# Patient Record
Sex: MALE | Race: WHITE
[De-identification: names, ages, dates, MRNs, and addresses within clinical notes are randomized per-mention and may not be internally consistent; named-entity substitution may affect disease eponyms.]

---

## 2021-03-19 ENCOUNTER — HOSPITAL ENCOUNTER (EMERGENCY)
Dept: HOSPITAL 76 - ED | Age: 36
Discharge: HOME | End: 2021-03-19
Payer: COMMERCIAL

## 2021-03-19 ENCOUNTER — HOSPITAL ENCOUNTER (OUTPATIENT)
Dept: HOSPITAL 76 - EMS | Age: 36
End: 2021-03-19
Payer: COMMERCIAL

## 2021-03-19 VITALS — DIASTOLIC BLOOD PRESSURE: 107 MMHG | SYSTOLIC BLOOD PRESSURE: 145 MMHG
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
## 2021-03-19 DIAGNOSIS — Z04.1: Primary | ICD-10-CM

## 2021-03-19 DIAGNOSIS — M54.5: Primary | ICD-10-CM

## 2021-03-19 DIAGNOSIS — M54.2: ICD-10-CM
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
## 2021-03-19 PROCEDURE — 99282 EMERGENCY DEPT VISIT SF MDM: CPT
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
## 2021-04-02 ENCOUNTER — HOSPITAL ENCOUNTER (OUTPATIENT)
Dept: HOSPITAL 76 - LAB.R | Age: 36
Discharge: HOME | End: 2021-04-02
Attending: FAMILY MEDICINE
Payer: COMMERCIAL

## 2021-04-02 DIAGNOSIS — Z20.822: ICD-10-CM

## 2021-04-02 DIAGNOSIS — R50.9: Primary | ICD-10-CM

## 2021-04-05 ENCOUNTER — HOSPITAL ENCOUNTER (OUTPATIENT)
Dept: HOSPITAL 76 - LAB.N | Age: 36
Discharge: HOME | End: 2021-04-05
Attending: FAMILY MEDICINE
Payer: COMMERCIAL

## 2021-04-05 DIAGNOSIS — Z20.822: ICD-10-CM

## 2021-04-05 DIAGNOSIS — R50.9: Primary | ICD-10-CM

## 2021-04-05 PROCEDURE — 87070 CULTURE OTHR SPECIMN AEROBIC: CPT

## 2021-04-05 PROCEDURE — 87086 URINE CULTURE/COLONY COUNT: CPT

## 2021-04-07 ENCOUNTER — HOSPITAL ENCOUNTER (EMERGENCY)
Dept: HOSPITAL 76 - ED | Age: 36
Discharge: HOME | End: 2021-04-07
Payer: COMMERCIAL

## 2021-04-07 VITALS — SYSTOLIC BLOOD PRESSURE: 122 MMHG | DIASTOLIC BLOOD PRESSURE: 75 MMHG

## 2021-04-07 DIAGNOSIS — J06.9: Primary | ICD-10-CM

## 2021-04-07 DIAGNOSIS — K29.00: ICD-10-CM

## 2021-04-07 LAB
ALBUMIN DIAFP-MCNC: 4.1 G/DL (ref 3.2–5.5)
ALBUMIN/GLOB SERPL: 1.2 {RATIO} (ref 1–2.2)
ALP SERPL-CCNC: 57 IU/L (ref 42–121)
ALT SERPL W P-5'-P-CCNC: 32 IU/L (ref 10–60)
ANION GAP SERPL CALCULATED.4IONS-SCNC: 9 MMOL/L (ref 6–13)
AST SERPL W P-5'-P-CCNC: 21 IU/L (ref 10–42)
BASOPHILS NFR BLD AUTO: 0 10^3/UL (ref 0–0.1)
BASOPHILS NFR BLD AUTO: 0.3 %
BILIRUB BLD-MCNC: 0.5 MG/DL (ref 0.2–1)
BUN SERPL-MCNC: 19 MG/DL (ref 6–20)
C TRACH DNA SPEC NAA+PROBE-ACNC: NEGATIVE
CALCIUM UR-MCNC: 9.2 MG/DL (ref 8.5–10.3)
CHLORIDE SERPL-SCNC: 104 MMOL/L (ref 101–111)
CLARITY UR REFRACT.AUTO: CLEAR
CO2 SERPL-SCNC: 27 MMOL/L (ref 21–32)
CREAT SERPLBLD-SCNC: 1.1 MG/DL (ref 0.6–1.2)
EOSINOPHIL # BLD AUTO: 0 10^3/UL (ref 0–0.7)
EOSINOPHIL NFR BLD AUTO: 0.3 %
ERYTHROCYTE [DISTWIDTH] IN BLOOD BY AUTOMATED COUNT: 12.7 % (ref 12–15)
GFRSERPLBLD MDRD-ARVRAT: 77 ML/MIN/{1.73_M2} (ref 89–?)
GLOBULIN SER-MCNC: 3.4 G/DL (ref 2.1–4.2)
GLUCOSE SERPL-MCNC: 97 MG/DL (ref 70–100)
GLUCOSE UR QL STRIP.AUTO: NEGATIVE MG/DL
HCT VFR BLD AUTO: 40.3 % (ref 42–52)
HGB UR QL STRIP: 13.7 G/DL (ref 14–18)
KETONES UR QL STRIP.AUTO: NEGATIVE MG/DL
LIPASE SERPL-CCNC: 23 U/L (ref 22–51)
LYMPHOCYTES # SPEC AUTO: 2.5 10^3/UL (ref 1.5–3.5)
LYMPHOCYTES NFR BLD AUTO: 24.3 %
MCH RBC QN AUTO: 33.2 PG (ref 27–31)
MCHC RBC AUTO-ENTMCNC: 34 G/DL (ref 32–36)
MCV RBC AUTO: 97.6 FL (ref 80–94)
MONOCYTES # BLD AUTO: 1.2 10^3/UL (ref 0–1)
MONOCYTES NFR BLD AUTO: 11.4 %
N GONORRHOEA DNA GENITAL QL NAA+PROBE: NEGATIVE
NEUTROPHILS # BLD AUTO: 6.6 10^3/UL (ref 1.5–6.6)
NEUTROPHILS # SNV AUTO: 10.4 X10^3/UL (ref 4.8–10.8)
NEUTROPHILS NFR BLD AUTO: 63.1 %
NITRITE UR QL STRIP.AUTO: NEGATIVE
NRBC # BLD AUTO: 0 /100WBC
NRBC # BLD AUTO: 0 X10^3/UL
PDW BLD AUTO: 9.4 FL (ref 7.4–11.4)
PH UR STRIP.AUTO: 6 PH (ref 5–7.5)
PLATELET # BLD: 277 10^3/UL (ref 130–450)
POTASSIUM SERPL-SCNC: 4 MMOL/L (ref 3.5–5)
PROT SPEC-MCNC: 7.5 G/DL (ref 6.7–8.2)
PROT UR STRIP.AUTO-MCNC: NEGATIVE MG/DL
RBC # UR STRIP.AUTO: (no result) /UL
RBC MAR: 4.13 10^6/UL (ref 4.7–6.1)
SODIUM SERPLBLD-SCNC: 140 MMOL/L (ref 135–145)
SP GR UR STRIP.AUTO: 1.02 (ref 1–1.03)
T VAGINALIS RRNA GENITAL QL PROBE: (no result)
UROBILINOGEN UR QL STRIP.AUTO: (no result) E.U./DL
UROBILINOGEN UR STRIP.AUTO-MCNC: NEGATIVE MG/DL

## 2021-04-07 PROCEDURE — 81003 URINALYSIS AUTO W/O SCOPE: CPT

## 2021-04-07 PROCEDURE — 87086 URINE CULTURE/COLONY COUNT: CPT

## 2021-04-07 PROCEDURE — 99285 EMERGENCY DEPT VISIT HI MDM: CPT

## 2021-04-07 PROCEDURE — 87591 N.GONORRHOEAE DNA AMP PROB: CPT

## 2021-04-07 PROCEDURE — 85025 COMPLETE CBC W/AUTO DIFF WBC: CPT

## 2021-04-07 PROCEDURE — 99284 EMERGENCY DEPT VISIT MOD MDM: CPT

## 2021-04-07 PROCEDURE — 76705 ECHO EXAM OF ABDOMEN: CPT

## 2021-04-07 PROCEDURE — 96374 THER/PROPH/DIAG INJ IV PUSH: CPT

## 2021-04-07 PROCEDURE — 80053 COMPREHEN METABOLIC PANEL: CPT

## 2021-04-07 PROCEDURE — 87661 TRICHOMONAS VAGINALIS AMPLIF: CPT

## 2021-04-07 PROCEDURE — 87491 CHLMYD TRACH DNA AMP PROBE: CPT

## 2021-04-07 PROCEDURE — 83690 ASSAY OF LIPASE: CPT

## 2021-04-07 PROCEDURE — 96375 TX/PRO/DX INJ NEW DRUG ADDON: CPT

## 2021-04-07 PROCEDURE — 36415 COLL VENOUS BLD VENIPUNCTURE: CPT

## 2021-04-07 PROCEDURE — 81001 URINALYSIS AUTO W/SCOPE: CPT

## 2021-04-07 NOTE — EXTERNAL MEDICAL SUMMARY RPT
Continuity of Care Document

                            Created on:2021



Patient:SUNIL VARGHESE

Sex:Male

:1985

External Reference #:[id number]





Demographics







                          Phone                     Unavailable

 

                          Preferred Language        Unknown

 

                          Marital Status            Unknown

 

                          Zoroastrian Affiliation     Unknown

 

                          Race                      Unknown

 

                          Ethnic Group              Unknown









Author







                          Organization              [facility]

 

                          [place]                    [place] [address]

 

                          Phone                     [phone]









Care Team Providers







                    Name                Role                Phone

 

                    Registrar, Rosa  Robledo,Patient Unavailable         Rudy lombardo RN, Jennifer Goodman,  Unavailable         Unavailable

 

                    MD, Galo Liang, Unavailable         Unavailable

 

                    PAARI, Jonny Nix, Unavailable         Unavailable

 

                    MD, Oliver Suazo, Unavailable         Unavailable









Problems







                     date                description         facility

 

                     [id number]            Alcohol use         All

 

                     [id number]            COVID19 Testing     All

 

                     [id number]            Fever, unspecified  All

 

                     [id number]            Details of drug misuse behavior  All

 

                     07923307            Alcohol intake      All

 

                     80034069            Fever               All

 

                     54425549            Acute pharyngitis   All

 

                     17543044            Acute pharyngitis, unspecified  All

 

                     [id number]            Alcohol use         All

 

                     12717147            COVID19 Testing     All

 

                     05345583            Throat Culture      All

 

                     39412374            Urine C&S           All

 

                     56719038            Details of drug misuse behavior  All

 

                     85418102            Exudative pharyngitis  All







Medications







                     date                description         facility

 

                     59234098            CYCLOBENZAPRINE HCL  All

 

                     35998043            IBUPROFEN           All

 

                     90657804            IBUPROFEN           All

 

                     29375107            CYCLOBENZAPRINE HCL  All

 

                     41941416            CYCLOBENZAPRINE HCL  All

 

                     12021852            IBUPROFEN           All

 

                     69250740            IBUPROFEN           All

 

                     54752472            CYCLOBENZAPRINE HCL  All

 

                     24632020            CYCLOBENZAPRINE HCL  All

 

                     63912986            IBUPROFEN           All

 

                     [id number]            IBUPROFEN           All

 

                     57222958            CYCLOBENZAPRINE HCL  All

 

                     36944810            CYCLOBENZAPRINE HCL  All

 

                     82529381            IBUPROFEN           All

 

                     17318554            IBUPROFEN           All

 

                     03896765            CYCLOBENZAPRINE HCL  All

 

                     64306611            CYCLOBENZAPRINE HCL  All

 

                     22332233            IBUPROFEN           All

 

                     63300949            IBUPROFEN           All

 

                     03215487            CYCLOBENZAPRINE HCL  All

 

                     94073050            AMOXICILLIN-POT CLAVULANATE  All

 

                     55913720            AMOXICILLIN-POT CLAVULANATE  All

 

                     64457613            AMOXICILLIN-POT CLAVULANATE  All

 

                     12137544            AMOXICILLIN-POT CLAVULANATE  All







Procedures







                     date                description         facility

 

                     67282424            POC STREP ASSAY W/OPTIC  All









                     date                description         facility

 

                     91883631            COVID19 Testing     All









                     date                description         facility

 

                     49535134            POC STREP ASSAY W/OPTIC  All









                     date                description         facility

 

                     05827414            COVID19 Testing     All









                     date                description         facility

 

                     18242636            POC STREP ASSAY W/OPTIC  All









                     date                description         facility

 

                     72225266            COVID19 Testing     All









                     date                description         facility

 

                     03903243            POC STREP ASSAY W/OPTIC  All









                     date                description         facility

 

                     99737816            POC STREP ASSAY W/OPTIC  All









                     date                description         facility

 

                     66910835            POC URINALYSIS DIP  All









                     date                description         facility

 

                     31320814            POC STREP TEST      All









                     date                description         facility

 

                     79922954            IM or SQ Injection  All









                     date                description         facility

 

                     81565692            Dexamethasone Sodium Phosphate Inj 10mg

/1mL  All









                     date                description         facility

 

                     83553450            POC URINALYSIS DIP  All









                     date                description         facility

 

                     96183857            POC STREP TEST      All









                     date                description         facility

 

                     96439251            IM or SQ Injection  All









                     date                description         facility

 

                     69878712            Dexamethasone Sodium Phosphate Inj 10mg

/1mL  All









                     date                description         facility

 

                     31905993            POC URINALYSIS DIP  All









                     date                description         facility

 

                     26667992            POC STREP TEST      All









                     date                description         facility

 

                     34080803            IM or SQ Injection  All









                     date                description         facility

 

                     82232727            Dexamethasone Sodium Phosphate Inj 10mg

/1mL  All







Results







            test       status     date       ordered by  attending  specimen jun

e

 

            Microbial_identificati  unknown    75095808   unknown    unknown    

unknown



           on_kit_rapid_strep_meth                                             



           od                                                     

 

            Streptococcus_pyogenes  unknown    76313224   unknown    unknown    

unknown



           _DNA_Presence_in_Throat                                             



           _by_NAA_with_probe_dete                                             



           ction                                                  

 

            T          unknown    15986611   unknown    unknown    unknown

 

            COVID-19_REFERENCE_TES  unknown    01181229   unknown    unknown    

unknown



           T                                                      

 

            Microbial_identificati  unknown    86419179   unknown    unknown    

unknown



           on_kit_rapid_strep_meth                                             



           od                                                     

 

            Streptococcus_pyogenes  unknown    39645939   unknown    unknown    

unknown



           _DNA_Presence_in_Throat                                             



           _by_NAA_with_probe_dete                                             



           ction                                                  

 

            _2019NCoV_COVID-19_Lab  unknown    90308360   unknown    unknown    

unknown



           _Test_Result_Text_                                             

 

            T          unknown    17966563   unknown    unknown    unknown

 

            COVID-19_REFERENCE_TES  unknown    99188717   unknown    unknown    

unknown



           T                                                      

 

            _2019NCoV_COVID-19_Lab  unknown    03133347   unknown    unknown    

unknown



           _Test_Result_Text_                                             

 

            Microbial_identificati  unknown    79149345   unknown    unknown    

unknown



           on_kit_rapid_strep_meth                                             



           od                                                     

 

            Streptococcus_pyogenes  unknown    26203141   unknown    unknown    

unknown



           _DNA_Presence_in_Throat                                             



           _by_NAA_with_probe_dete                                             



           ction                                                  

 

            DIPSTICK_URINE_STRIP_L  unknown    02797169   unknown    unknown    

unknown



           OT_NUMBER                                              

 

            protein_urine_semiquan  unknown    43597753   unknown    unknown    

unknown



           titative_dipstick_                                             

 

            glucose_urine_semiquan  unknown    06754512   unknown    unknown    

unknown



           titative                                               

 

            Erythrocytes_area_in_U  unknown    77182268   unknown    unknown    

unknown



           rine_sediment_by_Micros                                             



           copy_high_power_field                                             

 

            RBC_urine_dipstick  unknown    04840019   unknown    unknown    unkn

own

 

            Albumin_Presence_in_Ur  unknown    06766651   unknown    unknown    

unknown



           ine                                                    

 

            urine_color  unknown    26314168   unknown    unknown    unknown

 

            bilirubin_urine  unknown    29743788   unknown    unknown    unknown

 

            ketones_urine_by_test_  unknown    17970570   unknown    unknown    

unknown



           strip                                                  

 

            nitrite_urine_semiquan  unknown    12087625   unknown    unknown    

unknown



           titative                                               

 

            pH_urine_semiquantitat  unknown    30341451   unknown    unknown    

unknown



           baylee                                                    

 

            specific_gravity_urine  unknown    19665457   unknown    unknown    

unknown

 

            urobilinogen_urine_sem  unknown    04517577   unknown    unknown    

unknown



           iquantitative_dipstick_                                             

 

            leukocyte_esterase_uri  unknown    77108222   unknown    unknown    

unknown



           ne_by_dipstick                                             

 

            appearance_urine  unknown    34687070   unknown    unknown    unknow

n

 

            Microbial_identificati  unknown    49988034   unknown    unknown    

unknown



           on_kit_rapid_strep_meth                                             



           od                                                     

 

            urinalysis_routine  unknown    00931448   unknown    unknown    unkn

own

 

            culture_status  unknown    87115223   unknown    unknown    unknown

 

            Appearance_of_Urine  unknown    16911163   unknown    unknown    unk

nown

 

            Bilirubin.total_Presen  unknown    30963874   unknown    unknown    

unknown



           ce_in_Urine_by_Test_str                                             



           ip                                                     

 

            Color_of_Urine  unknown    33293029   unknown    unknown    unknown

 

            Glucose_Mass_volume_in  unknown    90569234   unknown    unknown    

unknown



           _Urine_by_Test_strip                                             

 

            Ketones_Mass_volume_in  unknown    74910472   unknown    unknown    

unknown



           _Urine_by_Test_strip                                             

 

            Leukocyte_esterase_Pre  unknown    98638790   unknown    unknown    

unknown



           sence_in_Urine_by_Test_                                             



           strip                                                  

 

            Nitrite_Presence_in_Ur  unknown    32503717   unknown    unknown    

unknown



           ine_by_Test_strip                                             

 

            pH_of_Urine_by_Test_st  unknown    34610755   unknown    unknown    

unknown



           rip                                                    

 

            Specific_gravity_of_Ur  unknown    17717559   unknown    unknown    

unknown



           ine_by_Test_strip                                             

 

            Urobilinogen_Presence_  unknown    06186888   unknown    unknown    

unknown



           in_Urine_by_Test_strip                                             

 

            Streptococcus_pyogenes  unknown    59867690   unknown    unknown    

unknown



           _DNA_Presence_in_Throat                                             



           _by_NAA_with_probe_dete                                             



           ction                                                  

 

            T          unknown    83239571   unknown    unknown    unknown

 

            COVID-19_REFERENCE_TES  unknown    74703276   unknown    unknown    

unknown



           T                                                      

 

            DIPSTICK_URINE_STRIP_L  unknown    10286244   unknown    unknown    

unknown



           OT_NUMBER                                              

 

            protein_urine_semiquan  unknown    50731647   unknown    unknown    

unknown



           titative_dipstick_                                             

 

            glucose_urine_semiquan  unknown    56171131   unknown    unknown    

unknown



           titative                                               

 

            Erythrocytes_area_in_U  unknown    48982089   unknown    unknown    

unknown



           rine_sediment_by_Micros                                             



           copy_high_power_field                                             

 

            RBC_urine_dipstick  unknown    69758855   unknown    unknown    unkn

own

 

            Albumin_Presence_in_Ur  unknown    80082847   unknown    unknown    

unknown



           ine                                                    

 

            urine_color  unknown    96182395   unknown    unknown    unknown

 

            bilirubin_urine  unknown    90825773   unknown    unknown    unknown

 

            ketones_urine_by_test_  unknown    49601120   unknown    unknown    

unknown



           strip                                                  

 

            nitrite_urine_semiquan  unknown    00385061   unknown    unknown    

unknown



           titative                                               

 

            pH_urine_semiquantitat  unknown    86513036   unknown    unknown    

unknown



           baylee                                                    

 

            specific_gravity_urine  unknown    89630390   unknown    unknown    

unknown

 

            urobilinogen_urine_sem  unknown    91107614   unknown    unknown    

unknown



           iquantitative_dipstick_                                             

 

            leukocyte_esterase_uri  unknown    59892646   unknown    unknown    

unknown



           ne_by_dipstick                                             

 

            appearance_urine  unknown    88813206   unknown    unknown    unknow

n

 

            Microbial_identificati  unknown    39139324   unknown    unknown    

unknown



           on_kit_rapid_strep_meth                                             



           od                                                     

 

            urinalysis_routine  unknown    85422517   unknown    unknown    unkn

own

 

            culture_status  unknown    76667689   unknown    unknown    unknown

 

            Appearance_of_Urine  unknown    20339477   unknown    unknown    unk

nown

 

            Bilirubin.total_Presen  unknown    34257452   unknown    unknown    

unknown



           ce_in_Urine_by_Test_str                                             



           ip                                                     

 

            Color_of_Urine  unknown    80614077   unknown    unknown    unknown

 

            Glucose_Mass_volume_in  unknown    93382970   unknown    unknown    

unknown



           _Urine_by_Test_strip                                             

 

            Ketones_Mass_volume_in  unknown    18472497   unknown    unknown    

unknown



           _Urine_by_Test_strip                                             

 

            Leukocyte_esterase_Pre  unknown    77376719   unknown    unknown    

unknown



           sence_in_Urine_by_Test_                                             



           strip                                                  

 

            Nitrite_Presence_in_Ur  unknown    31147145   unknown    unknown    

unknown



           ine_by_Test_strip                                             

 

            pH_of_Urine_by_Test_st  unknown    47977947   unknown    unknown    

unknown



           rip                                                    

 

            Specific_gravity_of_Ur  unknown    07220496   unknown    unknown    

unknown



           ine_by_Test_strip                                             

 

            Urobilinogen_Presence_  unknown    95576959   unknown    unknown    

unknown



           in_Urine_by_Test_strip                                             

 

            Streptococcus_pyogenes  unknown    94650661   unknown    unknown    

unknown



           _DNA_Presence_in_Throat                                             



           _by_NAA_with_probe_dete                                             



           ction                                                  

 

            _2019NCoV_COVID-19_Lab  unknown    49007518   unknown    unknown    

unknown



           _Test_Result_Text_                                             

 

            DIPSTICK_URINE_STRIP_L  unknown    93400327   unknown    unknown    

unknown



           OT_NUMBER                                              

 

            protein_urine_semiquan  unknown    52149970   unknown    unknown    

unknown



           titative_dipstick_                                             

 

            glucose_urine_semiquan  unknown    69206785   unknown    unknown    

unknown



           titative                                               

 

            Erythrocytes_area_in_U  unknown    28858162   unknown    unknown    

unknown



           rine_sediment_by_Micros                                             



           copy_high_power_field                                             

 

            RBC_urine_dipstick  unknown    32662634   unknown    unknown    unkn

own

 

            Albumin_Presence_in_Ur  unknown    73632959   unknown    unknown    

unknown



           ine                                                    

 

            urine_color  unknown    22132785   unknown    unknown    unknown

 

            bilirubin_urine  unknown    20855756   unknown    unknown    unknown

 

            ketones_urine_by_test_  unknown    82681832   unknown    unknown    

unknown



           strip                                                  

 

            nitrite_urine_semiquan  unknown    26605162   unknown    unknown    

unknown



           titative                                               

 

            pH_urine_semiquantitat  unknown    54918002   unknown    unknown    

unknown



           baylee                                                    

 

            specific_gravity_urine  unknown    45264817   unknown    unknown    

unknown

 

            urobilinogen_urine_sem  unknown    44209503   unknown    unknown    

unknown



           iquantitative_dipstick_                                             

 

            leukocyte_esterase_uri  unknown    48122333   unknown    unknown    

unknown



           ne_by_dipstick                                             

 

            appearance_urine  unknown    77568078   unknown    unknown    unknow

n

 

            Microbial_identificati  unknown    31218387   unknown    unknown    

unknown



           on_kit_rapid_strep_meth                                             



           od                                                     

 

            urinalysis_routine  unknown    74428371   unknown    unknown    unkn

own

 

            culture_status  unknown    41920541   unknown    unknown    unknown

 

            Appearance_of_Urine  unknown    41653477   unknown    unknown    unk

nown

 

            Bilirubin.total_Presen  unknown    25956836   unknown    unknown    

unknown



           ce_in_Urine_by_Test_str                                             



           ip                                                     

 

            Color_of_Urine  unknown    26973557   unknown    unknown    unknown

 

            Glucose_Mass_volume_in  unknown    22751699   unknown    unknown    

unknown



           _Urine_by_Test_strip                                             

 

            Ketones_Mass_volume_in  unknown    20010663   unknown    unknown    

unknown



           _Urine_by_Test_strip                                             

 

            Leukocyte_esterase_Pre  unknown    25337772   unknown    unknown    

unknown



           sence_in_Urine_by_Test_                                             



           strip                                                  

 

            Nitrite_Presence_in_Ur  unknown    44839919   unknown    unknown    

unknown



           ine_by_Test_strip                                             

 

            pH_of_Urine_by_Test_st  unknown    25578740   unknown    unknown    

unknown



           rip                                                    

 

            Specific_gravity_of_Ur  unknown    70365066   unknown    unknown    

unknown



           ine_by_Test_strip                                             

 

            Urobilinogen_Presence_  unknown    2021   unknown    unknown    

unknown



           in_Urine_by_Test_strip                                             

 

            Streptococcus_pyogenes  unknown    2021   unknown    unknown    

unknown



           _DNA_Presence_in_Throat                                             



           _by_NAA_with_probe_dete                                             



           ction                                                  









         facility  observation  status  value   reference  units   lab     [facility]

mal  line



                                        range           code            notes

 

         All     Microbial_id  unknown  Neg     unknown          _3554   unknown

  unknown



                entification_                                                 



                kit_rapid_str                                                 



                ep_method                                                 

 

         All     Streptococcu  unknown  Neg     unknown          _6048   unknown

  unknown



                s_pyogenes_DN                                 9-2             



                A_Presence_in                                                 



                _Throat_by_NA                                                 



                A_with_probe_                                                 



                detection                                                 

 

         All     T       unknown  NEGATIVE  unknown          COVID   unknown  un

known



                                                        -19             

 

         All     COVID-19_REF  unknown  NEGATIVE  unknown          COVID   unkno

wn  unknown



                ERENCE_TEST                                 19.REF          

 

         All     Microbial_id  unknown  Neg     unknown          _3554   unknown

  unknown



                entification_                                                 



                kit_rapid_str                                                 



                ep_method                                                 

 

         All     Streptococcu  unknown  Neg     unknown          _6048   unknown

  unknown



                s_pyogenes_DN                                 9-2             



                A_Presence_in                                                 



                _Throat_by_NA                                                 



                A_with_probe_                                                 



                detection                                                 

 

         All     _2019NCoV_CO  unknown  NEGATIVE  unknown          _6659   unkno

wn  unknown



                VID-19_Lab_Te                                 97              



                st_Result_Tex                                                 



                t_                                                      

 

         All     T       unknown  NEGATIVE  unknown          COVID   unknown  un

known



                                                        -19             

 

         All     COVID-19_REF  unknown  NEGATIVE  unknown          COVID   unkno

wn  unknown



                ERENCE_TEST                                 19.REF          

 

         All     _2019NCoV_CO  unknown  NEGATIVE  unknown          _6659   unkno

wn  unknown



                VID-19_Lab_Te                                 97              



                st_Result_Tex                                                 



                t_                                                      

 

         All     Microbial_id  unknown  Neg     unknown          _3554   unknown

  unknown



                entification_                                                 



                kit_rapid_str                                                 



                ep_method                                                 

 

         All     Streptococcu  unknown  Neg     unknown          _6048   unknown

  unknown



                s_pyogenes_DN                                 9-2             



                A_Presence_in                                                 



                _Throat_by_NA                                                 



                A_with_probe_                                                 



                detection                                                 

 

         All     DIPSTICK_URI  unknown  5067    unknown          _1014   unknown

  unknown



                NE_STRIP_LOT_                                 00              



                NUMBER                                                  

 

         All     protein_urin  unknown  negative  unknown          _118    unkno

wn  unknown



                e_semiquantit                                                 



                ative_dipstic                                                 



                k_                                                      

 

         All     glucose_urin  unknown  negative  unknown          _123    unkno

wn  unknown



                e_semiquantit                                                 



                ative                                                   

 

         All     Erythrocytes  unknown  2+      unknown          _1394   unknown

  unknown



                _area_in_Urin                                 5-1             



                e_sediment_by                                                 



                _Microscopy_h                                                 



                igh_power_fie                                                 



                ld                                                      

 

         All     RBC_urine_di  unknown  2+      unknown          _1700   unknown

  unknown



                pstick                                  005             

 

         All     Albumin_Pres  unknown  negative  unknown          _1753   unkno

wn  unknown



                ence_in_Urine                                 -3              

 

         All     urine_color  unknown  dark    unknown          _2751   unknown 

 unknown



                                yellow                                  

 

         All     bilirubin_ur  unknown  negative  unknown          _319    unkno

wn  unknown



                ine                                                     

 

         All     ketones_urin  unknown  large   unknown          _322    unknown

  unknown



                e_by_test_str         (160)                                   



                ip                                                      

 

         All     nitrite_urin  unknown  negative  unknown          _323    unkno

wn  unknown



                e_semiquantit                                                 



                ative                                                   

 

         All     pH_urine_sem  unknown  7.5     unknown          _324    unknown

  unknown



                iquantitative                                                 

 

         All     specific_gra  unknown  1.020   unknown          _325    unknown

  unknown



                vity_urine                                                 

 

         All     urobilinogen  unknown  negative  unknown          _326    unkno

wn  unknown



                _urine_semiqu                                                 



                antitative_di                                                 



                pstick_                                                 

 

         All     leukocyte_es  unknown  negative  unknown          _327    unkno

wn  unknown



                terase_urine_                                                 



                by_dipstick                                                 

 

         All     appearance_u  unknown  clear   unknown          _328    unknown

  unknown



                rine                                                    

 

         All     Microbial_id  unknown  Neg     unknown          _3554   unknown

  unknown



                entification_                                                 



                kit_rapid_str                                                 



                ep_method                                                 

 

         All     urinalysis_r  unknown  Clean   unknown          _47     unknown

  unknown



                outine          Catch                                   

 

         All     culture_stat  unknown  Yes     unknown          _5101   unknown

  unknown



                us                                      5               

 

         All     Appearance_o  unknown  clear   unknown          _5767   unknown

  unknown



                f_Urine                                 -9              

 

         All     Bilirubin.to  unknown  negative  unknown          _5770   unkno

wn  unknown



                tal_Presence_                                 -3              



                in_Urine_by_T                                                 



                est_strip                                                 

 

         All     Color_of_Uri  unknown  dark    unknown          _5778   unknown

  unknown



                ne              yellow                  -6              

 

         All     Glucose_Mass  unknown  negative  unknown          _5792   unkno

wn  unknown



                _volume_in_Ur                                 -7              



                ine_by_Test_s                                                 



                trip                                                    

 

         All     Ketones_Mass  unknown  large   unknown          _5797   unknown

  unknown



                _volume_in_Ur         (160)                   -6              



                ine_by_Test_s                                                 



                trip                                                    

 

         All     Leukocyte_es  unknown  negative  unknown          _5799   unkno

wn  unknown



                terase_Presen                                 -2              



                ce_in_Urine_b                                                 



                y_Test_strip                                                 

 

         All     Nitrite_Pres  unknown  negative  unknown          _5802   unkno

wn  unknown



                ence_in_Urine                                 -4              



                _by_Test_stri                                                 



                p                                                       

 

         All     pH_of_Urine_  unknown  7.5     unknown          _5803   unknown

  unknown



                by_Test_strip                                 -2              

 

         All     Specific_gra  unknown  1.020   unknown          _5811   unknown

  unknown



                vity_of_Urine                                 -5              



                _by_Test_stri                                                 



                p                                                       

 

         All     Urobilinogen  unknown  negative  unknown          _5818   unkno

wn  unknown



                _Presence_in_                                 -0              



                Urine_by_Test                                                 



                _strip                                                  

 

         All     Streptococcu  unknown  Neg     unknown          _6048   unknown

  unknown



                s_pyogenes_DN                                 9-2             



                A_Presence_in                                                 



                _Throat_by_NA                                                 



                A_with_probe_                                                 



                detection                                                 

 

         All     T       unknown  NEGATIVE  unknown          COVID   unknown  un

known



                                                        -19             

 

         All     COVID-19_REF  unknown  NEGATIVE  unknown          COVID   unkno

wn  unknown



                ERENCE_TEST                                 19.REF          

 

         All     DIPSTICK_URI  unknown  5067    unknown          _1014   unknown

  unknown



                NE_STRIP_LOT_                                 00              



                NUMBER                                                  

 

         All     protein_urin  unknown  negative  unknown          _118    unkno

wn  unknown



                e_semiquantit                                                 



                ative_dipstic                                                 



                k_                                                      

 

         All     glucose_urin  unknown  negative  unknown          _123    unkno

wn  unknown



                e_semiquantit                                                 



                ative                                                   

 

         All     Erythrocytes  unknown  2+      unknown          _1394   unknown

  unknown



                _area_in_Urin                                 5-1             



                e_sediment_by                                                 



                _Microscopy_h                                                 



                igh_power_fie                                                 



                ld                                                      

 

         All     RBC_urine_di  unknown  2+      unknown          _1700   unknown

  unknown



                pstick                                  005             

 

         All     Albumin_Pres  unknown  negative  unknown          _1753   unkno

wn  unknown



                ence_in_Urine                                 -3              

 

         All     urine_color  unknown  dark    unknown          _2751   unknown 

 unknown



                                yellow                                  

 

         All     bilirubin_ur  unknown  negative  unknown          _319    unkno

wn  unknown



                ine                                                     

 

         All     ketones_urin  unknown  large   unknown          _322    unknown

  unknown



                e_by_test_str         (160)                                   



                ip                                                      

 

         All     nitrite_urin  unknown  negative  unknown          _323    unkno

wn  unknown



                e_semiquantit                                                 



                ative                                                   

 

         All     pH_urine_sem  unknown  7.5     unknown          _324    unknown

  unknown



                iquantitative                                                 

 

         All     specific_gra  unknown  1.020   unknown          _325    unknown

  unknown



                vity_urine                                                 

 

         All     urobilinogen  unknown  negative  unknown          _326    unkno

wn  unknown



                _urine_semiqu                                                 



                antitative_di                                                 



                pstick_                                                 

 

         All     leukocyte_es  unknown  negative  unknown          _327    unkno

wn  unknown



                terase_urine_                                                 



                by_dipstick                                                 

 

         All     appearance_u  unknown  clear   unknown          _328    unknown

  unknown



                rine                                                    

 

         All     Microbial_id  unknown  Neg     unknown          _3554   unknown

  unknown



                entification_                                                 



                kit_rapid_str                                                 



                ep_method                                                 

 

         All     urinalysis_r  unknown  Clean   unknown          _47     unknown

  unknown



                outine          Catch                                   

 

         All     culture_stat  unknown  Yes     unknown          _5101   unknown

  unknown



                us                                      5               

 

         All     Appearance_o  unknown  clear   unknown          _5767   unknown

  unknown



                f_Urine                                 -9              

 

         All     Bilirubin.to  unknown  negative  unknown          _5770   unkno

wn  unknown



                tal_Presence_                                 -3              



                in_Urine_by_T                                                 



                est_strip                                                 

 

         All     Color_of_Uri  unknown  dark    unknown          _5778   unknown

  unknown



                ne              yellow                  -6              

 

         All     Glucose_Mass  unknown  negative  unknown          _5792   unkno

wn  unknown



                _volume_in_Ur                                 -7              



                ine_by_Test_s                                                 



                trip                                                    

 

         All     Ketones_Mass  unknown  large   unknown          _5797   unknown

  unknown



                _volume_in_Ur         (160)                   -6              



                ine_by_Test_s                                                 



                trip                                                    

 

         All     Leukocyte_es  unknown  negative  unknown          _5799   unkno

wn  unknown



                terase_Presen                                 -2              



                ce_in_Urine_b                                                 



                y_Test_strip                                                 

 

         All     Nitrite_Pres  unknown  negative  unknown          _5802   unkno

wn  unknown



                ence_in_Urine                                 -4              



                _by_Test_stri                                                 



                p                                                       

 

         All     pH_of_Urine_  unknown  7.5     unknown          _5803   unknown

  unknown



                by_Test_strip                                 -2              

 

         All     Specific_gra  unknown  1.020   unknown          _5811   unknown

  unknown



                vity_of_Urine                                 -5              



                _by_Test_stri                                                 



                p                                                       

 

         All     Urobilinogen  unknown  negative  unknown          _5818   unkno

wn  unknown



                _Presence_in_                                 -0              



                Urine_by_Test                                                 



                _strip                                                  

 

         All     Streptococcu  unknown  Neg     unknown          _6048   unknown

  unknown



                s_pyogenes_DN                                 9-2             



                A_Presence_in                                                 



                _Throat_by_NA                                                 



                A_with_probe_                                                 



                detection                                                 

 

         All     _2019NCoV_CO  unknown  NEGATIVE  unknown          _6659   unkno

wn  unknown



                VID-19_Lab_Te                                 97              



                st_Result_Tex                                                 



                t_                                                      

 

         All     DIPSTICK_URI  unknown  5067    unknown          _1014   unknown

  unknown



                NE_STRIP_LOT_                                 00              



                NUMBER                                                  

 

         All     protein_urin  unknown  negative  unknown          _118    unkno

wn  unknown



                e_semiquantit                                                 



                ative_dipstic                                                 



                k_                                                      

 

         All     glucose_urin  unknown  negative  unknown          _123    unkno

wn  unknown



                e_semiquantit                                                 



                ative                                                   

 

         All     Erythrocytes  unknown  2+      unknown          _1394   unknown

  unknown



                _area_in_Urin                                 5-1             



                e_sediment_by                                                 



                _Microscopy_h                                                 



                igh_power_fie                                                 



                ld                                                      

 

         All     RBC_urine_di  unknown  2+      unknown          _1700   unknown

  unknown



                pstick                                  005             

 

         All     Albumin_Pres  unknown  negative  unknown          _1753   unkno

wn  unknown



                ence_in_Urine                                 -3              

 

         All     urine_color  unknown  dark    unknown          _2751   unknown 

 unknown



                                yellow                                  

 

         All     bilirubin_ur  unknown  negative  unknown          _319    unkno

wn  unknown



                ine                                                     

 

         All     ketones_urin  unknown  large   unknown          _322    unknown

  unknown



                e_by_test_str         (160)                                   



                ip                                                      

 

         All     nitrite_urin  unknown  negative  unknown          _323    unkno

wn  unknown



                e_semiquantit                                                 



                ative                                                   

 

         All     pH_urine_sem  unknown  7.5     unknown          _324    unknown

  unknown



                iquantitative                                                 

 

         All     specific_gra  unknown  1.020   unknown          _325    unknown

  unknown



                vity_urine                                                 

 

         All     urobilinogen  unknown  negative  unknown          _326    unkno

wn  unknown



                _urine_semiqu                                                 



                antitative_di                                                 



                pstick_                                                 

 

         All     leukocyte_es  unknown  negative  unknown          _327    unkno

wn  unknown



                terase_urine_                                                 



                by_dipstick                                                 

 

         All     appearance_u  unknown  clear   unknown          _328    unknown

  unknown



                rine                                                    

 

         All     Microbial_id  unknown  Neg     unknown          _3554   unknown

  unknown



                entification_                                                 



                kit_rapid_str                                                 



                ep_method                                                 

 

         All     urinalysis_r  unknown  Clean   unknown          _47     unknown

  unknown



                outine          Catch                                   

 

         All     culture_stat  unknown  Yes     unknown          _5101   unknown

  unknown



                us                                      5               

 

         All     Appearance_o  unknown  clear   unknown          _[id number]   unknown

  unknown



                f_Urine                                 -9              

 

         All     Bilirubin.to  unknown  negative  unknown          _5770   unkno

wn  unknown



                tal_Presence_                                 -3              



                in_Urine_by_T                                                 



                est_strip                                                 

 

         All     Color_of_Uri  unknown  dark    unknown          _5778   unknown

  unknown



                ne              yellow                  -6              

 

         All     Glucose_Mass  unknown  negative  unknown          _5792   unkno

wn  unknown



                _volume_in_Ur                                 -7              



                ine_by_Test_s                                                 



                trip                                                    

 

         All     Ketones_Mass  unknown  large   unknown          _5797   unknown

  unknown



                _volume_in_Ur         (160)                   -6              



                ine_by_Test_s                                                 



                trip                                                    

 

         All     Leukocyte_es  unknown  negative  unknown          _5799   unkno

wn  unknown



                terase_Presen                                 -2              



                ce_in_Urine_b                                                 



                y_Test_strip                                                 

 

         All     Nitrite_Pres  unknown  negative  unknown          _5802   unkno

wn  unknown



                ence_in_Urine                                 -4              



                _by_Test_stri                                                 



                p                                                       

 

         All     pH_of_Urine_  unknown  7.5     unknown          _5803   unknown

  unknown



                by_Test_strip                                 -2              

 

         All     Specific_gra  unknown  1.020   unknown          _5811   unknown

  unknown



                vity_of_Urine                                 -5              



                _by_Test_stri                                                 



                p                                                       

 

         All     Urobilinogen  unknown  negative  unknown          _5818   unkno

wn  unknown



                _Presence_in_                                 -0              



                Urine_by_Test                                                 



                _strip                                                  

 

         All     Streptococcu  unknown  Neg     unknown          _6048   unknown

  unknown



                s_pyogenes_DN                                 9-2             



                A_Presence_in                                                 



                _Throat_by_NA                                                 



                A_with_probe_                                                 



                detection                                                 







Vital Signs







                 date            measurement     value           source

 

                 2021        BP_diastolic    73              mm[Hg]

 

                 2021        BP_systolic     121             mm[Hg]

 

                 2021        heart_rate      71              /min

 

                 2021        respiration_rate  12              /min

 

                 2021        temperature_metric  36.33           C

 

                 2021        temperature_standard  97.4            F

 

                 2021        BP_diastolic    73              mm[Hg]

 

                 [id number]        BP_systolic     121             mm[Hg]

 

                 2021        heart_rate      71              /min

 

                 2021        respiration_rate  12              /min

 

                 2021        temperature_metric  36.33           C

 

                 2021        temperature_standard  97.4            F

 

                 2021        BP_diastolic    73              mm[Hg]

 

                 [id number]        BP_systolic     121             mm[Hg]

 

                 [id number]        heart_rate      71              /min

 

                 2021        respiration_rate  12              /min

 

                 2021        temperature_metric  36.33           C

 

                 2021        temperature_standard  97.4            F









                 date            measurement     value           source

 

                 2021        BMI             27.03           kg/m2

 

                 2021        BP_diastolic    72              mm[Hg]

 

                 2021        BP_systolic     114             mm[Hg]

 

                 2021        heart_rate      95              /min

 

                 2021        height_metric   176.53          cm

 

                 2021        height_standard  69.5            in

 

                 2021        respiration_rate  12              /min

 

                 2021        temperature_metric  37.89           C

 

                 2021        temperature_standard  100.2           F

 

                 2021        weight_metric   83.91           kg

 

                 2021        weight_standard  185             lb

 

                 2021        BMI             27.03           kg/m2

 

                 2021        BP_diastolic    72              mm[Hg]

 

                 2021        BP_systolic     114             mm[Hg]

 

                 2021        heart_rate      95              /min

 

                 2021        height_metric   176.53          cm

 

                 2021        height_standard  69.5            in

 

                 2021        respiration_rate  12              /min

 

                 2021        temperature_metric  37.89           C

 

                 2021        temperature_standard  100.2           F

 

                 2021        weight_metric   83.91           kg

 

                 2021        weight_standard  185             lb

 

                 2021        BMI             27.03           kg/m2

 

                 2021        BP_diastolic    72              mm[Hg]

 

                 [id number]        BP_systolic     114             mm[Hg]

 

                 2021        heart_rate      95              /min

 

                 2021        height_metric   176.53          cm

 

                 2021        height_standard  69.5            in

 

                 2021        respiration_rate  12              /min

 

                 2021        temperature_metric  37.89           C

 

                 2021        temperature_standard  100.2           F

 

                 2021        weight_metric   83.91           kg

 

                 2021        weight_standard  185             lb







Social History







                     date                description         facility

 

                     [id number] Refill requested for:     Effexor 75mg    Last filled on:     7/29/2019   #90   3Refills  Last office visit:     7/29/2019  Pending office visit 7/15/2020    Medication refilled per protocol.

## 2021-04-07 NOTE — ULTRASOUND REPORT
PROCEDURE:  Abdomen Limited

 

INDICATIONS:  upper abd pain, pain with eating, fever/sore throa

 

TECHNIQUE:  

Real-time scanning was performed of the abdominal and retroperitoneal organs, with image documentatio
n.  

 

COMPARISON:  None.

 

FINDINGS:  

 

Liver:  Liver is normal in size and homogeneous in echotexture.  

 

Gallbladder: Gallbladder contains multiple gallstones and several: Bladder polyps. Gallstones measure
 up to 9 mm in size. Gallbladder polyps measure 6 mm or smaller. There is no wall thickening. No bryan
cholecystic fluid. No abnormal sonographic Ho's sign per sonographer report.

 

Biliary ducts:  Intrahepatic bile ducts are non-dilated.  Extrahepatic bile duct caliber measures 4 m
m.  Normal is 6-7 mm or less in diameter, or 10 mm or less post-cholecystectomy.  

 

Pancreas:  Visualized portions of the pancreas are sonographically normal.  

 

Kidneys:  Kidneys are normal in size and echotexture.  Right kidney measures 13.0 cm long; no hydrone
phrosis or nephrolithiasis.  No solid masses.  

 

Miscellaneous:  No free abdominal fluid.  

 

IMPRESSION:  

1. Cholelithiasis without sonographic evidence for acute cholecystitis.

2. Gallbladder polyps measuring up to 6 mm in maximum dimension. No specific imaging follow-up requir
ed.

 

Reviewed by: Denis Hoskins MD on 4/7/2021 11:57 AM PDT

Approved by: Denis Hoskins MD on 4/7/2021 11:57 AM PDT

 

 

Station ID:  [id number]

## 2021-04-07 NOTE — ED PHYSICIAN DOCUMENTATION
PD HPI ABD PAIN





- Stated complaint


Stated Complaint: STOMACH PX





- Chief complaint


Chief Complaint: Abd Pain





- History obtained from


History obtained from: Patient





- History of Present Illness


Timing - onset: How many days ago (2-3)


Timing - duration: Days (2-3)


Timing - details: Gradual onset, Waxing and waning


Quality: Cramping, Aching, Pain


Location: Epigastric


Radiation: Upper back.  No: Chest


Improved by: Laying still.  No: Vomiting


Worsened by: Eating, Palpation.  No: Breathing


Associated symptoms: Fever (several days ago), Nausea (for 2-3 days), Vomiting 

(only few times).  No: Diarrhea, Constipation, Dysuria, Chest pain


Similar symptoms before: Has not had sx before


Recently seen: Clinic (walk in clinic with strep and COVID test that was 

negative, and rapid strep neg. Culture pending. Seen again and was given Rx 

antiemetic. Still with pain on eating.)





Review of Systems


Constitutional: reports: Fever (several days ago)


Nose: reports: Congestion.  denies: Rhinorrhea / runny nose


Throat: reports: Sore throat (several days ago, with feeling swelling left 

anterior neck. Nausea. fever. Developed some upper abd pain after that.)


Cardiac: denies: Chest pain / pressure, Palpitations


Respiratory: denies: Dyspnea, Cough


GI: reports: Abdominal Pain, Nausea, Vomiting.  denies: Abdominal Swelling, 

Constipation, Diarrhea


: denies: Dysuria, Frequency


Skin: denies: Rash





PD PAST MEDICAL HISTORY





- Past Medical History


Cardiovascular: None


Respiratory: None


Endocrine/Autoimmune: None


GI: None


: None


HEENT: None


Psych: None


Musculoskeletal: None


Derm: None





- Past Surgical History


Past Surgical History: No


Ortho: Other





- Present Medications


Home Medications: 


                                Ambulatory Orders











 Medication  Instructions  Recorded  Confirmed


 


Amox/Clav 875/125 [Augmentin 1 tab BID 04/07/21 04/07/21





875/125 Tab]   


 


Famotidine [Pepcid] 20 mg PO BID #20 tablet 04/07/21 


 


HYDROcod/ACETAM 5/325 [Norco 5/325] 1 ea PO Q6H PRN #12 tablet 04/07/21 


 


Lidocaine Viscous 2% [Xylocaine 5 ml PO Q4H PRN #100 ml 04/07/21 





Viscous 2%]   


 


Ondansetron Odt [Zofran] 4 mg TL Q6H PRN #15 tablet 04/07/21 














- Allergies


Allergies/Adverse Reactions: 


                                    Allergies











Allergy/AdvReac Type Severity Reaction Status Date / Time


 


No Known Drug Allergies Allergy   Verified 04/07/21 09:52














- Social History


Does the pt smoke?: No


Smoking Status: Never smoker


Does the pt drink ETOH?: Yes


Does the pt have substance abuse?: No





- Immunizations


Immunizations are current?: Yes





- POLST


Patient has POLST: No





PD ED PE NORMAL





- Vitals


Vital signs reviewed: Yes





- General


General: Alert and oriented X 3, Well developed/nourished, Other (appears uncom

fortable)





- HEENT


HEENT: Moist mucous membranes, Pharynx benign





- Neck


Neck: Supple, no meningeal sign, No adenopathy





- Cardiac


Cardiac: RRR, No murmur





- Respiratory


Respiratory: No respiratory distress, Clear bilaterally





- Abdomen


Abdomen: Soft, Non distended, No organomegaly, Other (Gastrointestinal right 

upper quadrant area with some local guarding.  No percussion or rebound.)





- Male 


Male : Deferred





- Rectal


Rectal: Deferred





- Back


Back: No CVA TTP





- Derm


Derm: Normal color, Warm and dry





- Neuro


Neuro: Alert and oriented X 3, No motor deficit, Normal speech





Results





- Vitals


Vitals: 


                               Vital Signs - 24 hr











  04/07/21 04/07/21 04/07/21





  09:48 11:48 12:00


 


Temperature 36.5 C 36.1 C L 


 


Heart Rate 79 68 60


 


Respiratory 14 14 16





Rate   


 


Blood Pressure 134/78 H 128/76 127/75


 


O2 Saturation 99 97 100














  04/07/21 04/07/21





  13:00 14:00


 


Temperature  


 


Heart Rate 58 L 74


 


Respiratory 16 16





Rate  


 


Blood Pressure 117/67 122/75


 


O2 Saturation 100 100








                                     Oxygen











O2 Source                      Room air

















- Labs


Labs: 


                                Laboratory Tests











  04/07/21 04/07/21 04/07/21





  10:49 10:49 12:18


 


WBC  10.4  


 


RBC  4.13 L  


 


Hgb  13.7 L  


 


Hct  40.3 L  


 


MCV  97.6 H  


 


MCH  33.2 H  


 


MCHC  34.0  


 


RDW  12.7  


 


Plt Count  277  


 


MPV  9.4  


 


Neut # (Auto)  6.6  


 


Lymph # (Auto)  2.5  


 


Mono # (Auto)  1.2 H  


 


Eos # (Auto)  0.0  


 


Baso # (Auto)  0.0  


 


Absolute Nucleated RBC  0.00  


 


Nucleated RBC %  0.0  


 


Sodium   140 


 


Potassium   4.0 


 


Chloride   104 


 


Carbon Dioxide   27 


 


Anion Gap   9.0 


 


BUN   19 


 


Creatinine   1.1 


 


Estimated GFR (MDRD)   77 L 


 


Glucose   97 


 


Calcium   9.2 


 


Total Bilirubin   0.5 


 


AST   21 


 


ALT   32 


 


Alkaline Phosphatase   57 


 


Total Protein   7.5 


 


Albumin   4.1 


 


Globulin   3.4 


 


Albumin/Globulin Ratio   1.2 


 


Lipase   23 


 


Urine Color    YELLOW


 


Urine Clarity    CLEAR


 


Urine pH    6.0


 


Ur Specific Gravity    1.020


 


Urine Protein    NEGATIVE


 


Urine Glucose (UA)    NEGATIVE


 


Urine Ketones    NEGATIVE


 


Urine Occult Blood    TRACE-INTA


 


Urine Nitrite    NEGATIVE


 


Urine Bilirubin    NEGATIVE


 


Urine Urobilinogen    0.2 (NORMAL)


 


Ur Leukocyte Esterase    NEGATIVE


 


Ur Microscopic Review    NOT INDICATED


 


Urine Culture Comments    NOT INDICATED














- Rads (name of study)


  ** upper abd US


Radiology: Prelim report reviewed (Home mobile gallstones noted.  A small polyp 

as well.  No stones near the neck and no signs of wall thickening nor 

pericholecystic fluid.  The bile duct is normal.), See rad report





PD MEDICAL DECISION MAKING





- ED course


Complexity details: re-evaluated patient (Improved nausea and decreased pain.  

He is still having some increased pain with oral intake but able to keep it 

down.  And tolerated.), considered differential (ShowingHis initial symptoms 

seem like a viral URI.  His strep test was negative and no bacterial cause.  His

 Covid test was negative as well.  He is now having epigastric pain and seems 

likely gastritis subsequent to the illness.  Will check for gallbladder as 

well.), d/w patient





Departure





- Departure


Disposition: 01 Home, Self Care


Clinical Impression: 


Upper respiratory infection


Qualifiers:


 URI type: unspecified URI Qualified Code(s): J06.9 - Acute upper respiratory 

infection, unspecified





Acute gastritis


Qualifiers:


 Gastritis type: unspecified gastritis Gastritis bleeding: without bleeding 

Qualified Code(s): K29.00 - Acute gastritis without bleeding





Condition: Stable


Record reviewed to determine appropriate education?: Yes


Instructions:  ED Gastritis


Follow-Up: 


Davie Martinez MD [Primary Care Provider] - 


Prescriptions: 


HYDROcod/ACETAM 5/325 [Norco 5/325] 1 ea PO Q6H PRN #12 tablet


 PRN Reason: Pain


Famotidine [Pepcid] 20 mg PO BID #20 tablet


Lidocaine Viscous 2% [Xylocaine Viscous 2%] 5 ml PO Q4H PRN #100 ml


 PRN Reason: Pain


Ondansetron Odt [Zofran] 4 mg TL Q6H PRN #15 tablet


 PRN Reason: Nausea / Vomiting


Comments: 


Small frequent fluids and soft food for the next several days as you are 

improving.





Your throat culture from the other day did result in is showing no bacterial 

growth.  Presume you had a viral illness with the sore throat and fever.  It 

sounds like you now have an irritation of the stomach (gastritis).





Famotidine acid reducing medicine twice daily for the next 7 to 10 days.  Add 

ondansetron if needed for nausea.  Add Tylenol or hydrocodone as needed for 

pain.





Use antacid such as Maalox or Mylanta and you can add some lidocaine with it to 

help with stomach pain as well.





Off work for 2 to 3 days.





Follow-up with your primary care if not well improved over the next 2 to 3 days.


Forms:  Activity restrictions


Discharge Date/Time: 04/07/21 14:40

## 2021-04-07 NOTE — EXTERNAL MEDICAL SUMMARY RPT
Continuity of Care Document

                            Created on:2021



Patient:SUNIL VARGHESE

Sex:Male

:1985

External Reference #:[id number]





Demographics







                          Phone                     Unavailable

 

                          Preferred Language        Unknown

 

                          Marital Status            Unknown

 

                          Yarsanism Affiliation     Unknown

 

                          Race                      Unknown

 

                          Ethnic Group              Unknown









Author







                          Organization              [facility]

 

                          Address                    [place] [address]

 

                          Phone                     [phone]









Care Team Providers







                    Name                Role                Phone

 

                    PA-C                Unavailable         Unavailable

 

                    Registrar           Unavailable         Unavailable

 

                    RN                  Unavailable         Unavailable

 

                    MD                  Unavailable         Unavailable

 

                    MD                  Unavailable         Unavailable









Problems







                     date                description         facility

 

                     [id number]            Alcohol use         All

 

                     1985            COVID19 Testing     All

 

                     [id number]            Fever, unspecified  All

 

                     [id number]            Details of drug misuse behavior  All

 

                     [id number]            Alcohol intake      All

 

                     24276491            Fever               All

 

                     17850436            Acute pharyngitis   All

 

                     16871590            Acute pharyngitis, unspecified  All

 

                     19992103            Alcohol use         All

 

                     87781683            COVID19 Testing     All

 

                     [id number]            Throat Culture      All

 

                     [id number]            Urine C&S           All

 

                     94848197            Details of drug misuse behavior  All

 

                     31277550            Exudative pharyngitis  All







Medications







                     date                description         facility

 

                     88875475            CYCLOBENZAPRINE HCL  All

 

                     76624600            IBUPROFEN           All

 

                     [id number]            IBUPROFEN           All

 

                     91995556            CYCLOBENZAPRINE HCL  All

 

                     58711915            CYCLOBENZAPRINE HCL  All

 

                     [id number]            IBUPROFEN           All

 

                     11926096            IBUPROFEN           All

 

                     61109352            CYCLOBENZAPRINE HCL  All

 

                     19220133            CYCLOBENZAPRINE HCL  All

 

                     74035359            IBUPROFEN           All

 

                     18222621            IBUPROFEN           All

 

                     [id number]            CYCLOBENZAPRINE HCL  All

 

                     48153681            CYCLOBENZAPRINE HCL  All

 

                     72375891            IBUPROFEN           All

 

                     34993428            IBUPROFEN           All

 

                     57546028            CYCLOBENZAPRINE HCL  All

 

                     28020416            CYCLOBENZAPRINE HCL  All

 

                     47982818            IBUPROFEN           All

 

                     [id number]            IBUPROFEN           All

 

                     [id number]            CYCLOBENZAPRINE HCL  All

 

                     55938184            AMOXICILLIN-POT CLAVULANATE  All

 

                     28991128            AMOXICILLIN-POT CLAVULANATE  All

 

                     25125787            AMOXICILLIN-POT CLAVULANATE  All

 

                     78062993            AMOXICILLIN-POT CLAVULANATE  All







Procedures







                     date                description         facility

 

                     91315446            POC STREP ASSAY W/OPTIC  All









                     date                description         facility

 

                     87268848            COVID19 Testing     All









                     date                description         facility

 

                     61120663            POC STREP ASSAY W/OPTIC  All









                     date                description         facility

 

                     60374558            COVID19 Testing     All









                     date                description         facility

 

                     97152456            POC STREP ASSAY W/OPTIC  All









                     date                description         facility

 

                     80488863            COVID19 Testing     All









                     date                description         facility

 

                     76706170            POC STREP ASSAY W/OPTIC  All









                     date                description         facility

 

                     54425128            POC STREP ASSAY W/OPTIC  All









                     date                description         facility

 

                     53092515            POC URINALYSIS DIP  All









                     date                description         facility

 

                     47697167            POC STREP TEST      All









                     date                description         facility

 

                     24255947            IM or SQ Injection  All









                     date                description         facility

 

                     49798426            Dexamethasone Sodium Phosphate Inj 10mg

/1mL  All









                     date                description         facility

 

                     49466367            POC URINALYSIS DIP  All









                     date                description         facility

 

                     66135622            POC STREP TEST      All









                     date                description         facility

 

                     84389481            IM or SQ Injection  All









                     date                description         facility

 

                     85730031            Dexamethasone Sodium Phosphate Inj 10mg

/1mL  All









                     date                description         facility

 

                     69293478            POC URINALYSIS DIP  All









                     date                description         facility

 

                     62513200            POC STREP TEST      All









                     date                description         facility

 

                     55502181            IM or SQ Injection  All









                     date                description         facility

 

                     2021            Dexamethasone Sodium Phosphate Inj 10mg

/1mL  All







Results







            test       status     date       ordered by  attending  specimen jun

e

 

            Microbial_identificati  unknown    [id number]   unknown    unknown    

unknown



           on_kit_rapid_strep_meth                                             



           od                                                     

 

            Streptococcus_pyogenes  unknown    75171036   unknown    unknown    

unknown



           _DNA_Presence_in_Throat                                             



           _by_NAA_with_probe_dete                                             



           ction                                                  

 

            T          unknown    15367748   unknown    unknown    unknown

 

            COVID-19_REFERENCE_TES  unknown    91376584   unknown    unknown    

unknown



           T                                                      

 

            Microbial_identificati  unknown    32402224   unknown    unknown    

unknown



           on_kit_rapid_strep_meth                                             



           od                                                     

 

            Streptococcus_pyogenes  unknown    70822894   unknown    unknown    

unknown



           _DNA_Presence_in_Throat                                             



           _by_NAA_with_probe_dete                                             



           ction                                                  

 

            _2019NCoV_COVID-19_Lab  unknown    74476766   unknown    unknown    

unknown



           _Test_Result_Text_                                             

 

            T          unknown    57381630   unknown    unknown    unknown

 

            COVID-19_REFERENCE_TES  unknown    91473725   unknown    unknown    

unknown



           T                                                      

 

            _2019NCoV_COVID-19_Lab  unknown    93488478   unknown    unknown    

unknown



           _Test_Result_Text_                                             

 

            Microbial_identificati  unknown    90298668   unknown    unknown    

unknown



           on_kit_rapid_strep_meth                                             



           od                                                     

 

            Streptococcus_pyogenes  unknown    51559834   unknown    unknown    

unknown



           _DNA_Presence_in_Throat                                             



           _by_NAA_with_probe_dete                                             



           ction                                                  

 

            DIPSTICK_URINE_STRIP_L  unknown    44988263   unknown    unknown    

unknown



           OT_NUMBER                                              

 

            protein_urine_semiquan  unknown    61305918   unknown    unknown    

unknown



           titative_dipstick_                                             

 

            glucose_urine_semiquan  unknown    64933862   unknown    unknown    

unknown



           titative                                               

 

            Erythrocytes_area_in_U  unknown    99491548   unknown    unknown    

unknown



           rine_sediment_by_Micros                                             



           copy_high_power_field                                             

 

            RBC_urine_dipstick  unknown    02908686   unknown    unknown    unkn

own

 

            Albumin_Presence_in_Ur  unknown    98382401   unknown    unknown    

unknown



           ine                                                    

 

            urine_color  unknown    72430989   unknown    unknown    unknown

 

            bilirubin_urine  unknown    95351266   unknown    unknown    unknown

 

            ketones_urine_by_test_  unknown    92060102   unknown    unknown    

unknown



           strip                                                  

 

            nitrite_urine_semiquan  unknown    10348803   unknown    unknown    

unknown



           titative                                               

 

            pH_urine_semiquantitat  unknown    39147219   unknown    unknown    

unknown



           baylee                                                    

 

            specific_gravity_urine  unknown    56645248   unknown    unknown    

unknown

 

            urobilinogen_urine_sem  unknown    10184174   unknown    unknown    

unknown



           iquantitative_dipstick_                                             

 

            leukocyte_esterase_uri  unknown    28719718   unknown    unknown    

unknown



           ne_by_dipstick                                             

 

            appearance_urine  unknown    44108989   unknown    unknown    unknow

n

 

            Microbial_identificati  unknown    40831502   unknown    unknown    

unknown



           on_kit_rapid_strep_meth                                             



           od                                                     

 

            urinalysis_routine  unknown    89293563   unknown    unknown    unkn

own

 

            culture_status  unknown    16720227   unknown    unknown    unknown

 

            Appearance_of_Urine  unknown    98300389   unknown    unknown    unk

nown

 

            Bilirubin.total_Presen  unknown    62624574   unknown    unknown    

unknown



           ce_in_Urine_by_Test_str                                             



           ip                                                     

 

            Color_of_Urine  unknown    49340926   unknown    unknown    unknown

 

            Glucose_Mass_volume_in  unknown    82387084   unknown    unknown    

unknown



           _Urine_by_Test_strip                                             

 

            Ketones_Mass_volume_in  unknown    35969762   unknown    unknown    

unknown



           _Urine_by_Test_strip                                             

 

            Leukocyte_esterase_Pre  unknown    37519566   unknown    unknown    

unknown



           sence_in_Urine_by_Test_                                             



           strip                                                  

 

            Nitrite_Presence_in_Ur  unknown    27773643   unknown    unknown    

unknown



           ine_by_Test_strip                                             

 

            pH_of_Urine_by_Test_st  unknown    92767183   unknown    unknown    

unknown



           rip                                                    

 

            Specific_gravity_of_Ur  unknown    73123001   unknown    unknown    

unknown



           ine_by_Test_strip                                             

 

            Urobilinogen_Presence_  unknown    94214860   unknown    unknown    

unknown



           in_Urine_by_Test_strip                                             

 

            Streptococcus_pyogenes  unknown    37058901   unknown    unknown    

unknown



           _DNA_Presence_in_Throat                                             



           _by_NAA_with_probe_dete                                             



           ction                                                  

 

            T          unknown    42142919   unknown    unknown    unknown

 

            COVID-19_REFERENCE_TES  unknown    63944101   unknown    unknown    

unknown



           T                                                      

 

            DIPSTICK_URINE_STRIP_L  unknown    53790766   unknown    unknown    

unknown



           OT_NUMBER                                              

 

            protein_urine_semiquan  unknown    76325669   unknown    unknown    

unknown



           titative_dipstick_                                             

 

            glucose_urine_semiquan  unknown    31769978   unknown    unknown    

unknown



           titative                                               

 

            Erythrocytes_area_in_U  unknown    80133620   unknown    unknown    

unknown



           rine_sediment_by_Micros                                             



           copy_high_power_field                                             

 

            RBC_urine_dipstick  unknown    88311371   unknown    unknown    unkn

own

 

            Albumin_Presence_in_Ur  unknown    73034322   unknown    unknown    

unknown



           ine                                                    

 

            urine_color  unknown    42892606   unknown    unknown    unknown

 

            bilirubin_urine  unknown    85066435   unknown    unknown    unknown

 

            ketones_urine_by_test_  unknown    64666842   unknown    unknown    

unknown



           strip                                                  

 

            nitrite_urine_semiquan  unknown    04205212   unknown    unknown    

unknown



           titative                                               

 

            pH_urine_semiquantitat  unknown    61422311   unknown    unknown    

unknown



           baylee                                                    

 

            specific_gravity_urine  unknown    99082808   unknown    unknown    

unknown

 

            urobilinogen_urine_sem  unknown    07877482   unknown    unknown    

unknown



           iquantitative_dipstick_                                             

 

            leukocyte_esterase_uri  unknown    01054765   unknown    unknown    

unknown



           ne_by_dipstick                                             

 

            appearance_urine  unknown    66725067   unknown    unknown    unknow

n

 

            Microbial_identificati  unknown    56098144   unknown    unknown    

unknown



           on_kit_rapid_strep_meth                                             



           od                                                     

 

            urinalysis_routine  unknown    32476271   unknown    unknown    unkn

own

 

            culture_status  unknown    18874568   unknown    unknown    unknown

 

            Appearance_of_Urine  unknown    80998895   unknown    unknown    unk

nown

 

            Bilirubin.total_Presen  unknown    75900715   unknown    unknown    

unknown



           ce_in_Urine_by_Test_str                                             



           ip                                                     

 

            Color_of_Urine  unknown    43863619   unknown    unknown    unknown

 

            Glucose_Mass_volume_in  unknown    92628078   unknown    unknown    

unknown



           _Urine_by_Test_strip                                             

 

            Ketones_Mass_volume_in  unknown    68340568   unknown    unknown    

unknown



           _Urine_by_Test_strip                                             

 

            Leukocyte_esterase_Pre  unknown    26804639   unknown    unknown    

unknown



           sence_in_Urine_by_Test_                                             



           strip                                                  

 

            Nitrite_Presence_in_Ur  unknown    12962716   unknown    unknown    

unknown



           ine_by_Test_strip                                             

 

            pH_of_Urine_by_Test_st  unknown    52075092   unknown    unknown    

unknown



           rip                                                    

 

            Specific_gravity_of_Ur  unknown    33776764   unknown    unknown    

unknown



           ine_by_Test_strip                                             

 

            Urobilinogen_Presence_  unknown    11138805   unknown    unknown    

unknown



           in_Urine_by_Test_strip                                             

 

            Streptococcus_pyogenes  unknown    75279779   unknown    unknown    

unknown



           _DNA_Presence_in_Throat                                             



           _by_NAA_with_probe_dete                                             



           ction                                                  

 

            _2019NCoV_COVID-19_Lab  unknown    80708215   unknown    unknown    

unknown



           _Test_Result_Text_                                             

 

            DIPSTICK_URINE_STRIP_L  unknown    02531507   unknown    unknown    

unknown



           OT_NUMBER                                              

 

            protein_urine_semiquan  unknown    06440381   unknown    unknown    

unknown



           titative_dipstick_                                             

 

            glucose_urine_semiquan  unknown    26899067   unknown    unknown    

unknown



           titative                                               

 

            Erythrocytes_area_in_U  unknown    41561967   unknown    unknown    

unknown



           rine_sediment_by_Micros                                             



           copy_high_power_field                                             

 

            RBC_urine_dipstick  unknown    09750855   unknown    unknown    unkn

own

 

            Albumin_Presence_in_Ur  unknown    20761292   unknown    unknown    

unknown



           ine                                                    

 

            urine_color  unknown    14419312   unknown    unknown    unknown

 

            bilirubin_urine  unknown    98982561   unknown    unknown    unknown

 

            ketones_urine_by_test_  unknown    06943467   unknown    unknown    

unknown



           strip                                                  

 

            nitrite_urine_semiquan  unknown    35293648   unknown    unknown    

unknown



           titative                                               

 

            pH_urine_semiquantitat  unknown    62882931   unknown    unknown    

unknown



           baylee                                                    

 

            specific_gravity_urine  unknown    16614601   unknown    unknown    

unknown

 

            urobilinogen_urine_sem  unknown    89439216   unknown    unknown    

unknown



           iquantitative_dipstick_                                             

 

            leukocyte_esterase_uri  unknown    44989179   unknown    unknown    

unknown



           ne_by_dipstick                                             

 

            appearance_urine  unknown    88079818   unknown    unknown    unknow

n

 

            Microbial_identificati  unknown    76666387   unknown    unknown    

unknown



           on_kit_rapid_strep_meth                                             



           od                                                     

 

            urinalysis_routine  unknown    83701692   unknown    unknown    unkn

own

 

            culture_status  unknown    14811891   unknown    unknown    unknown

 

            Appearance_of_Urine  unknown    82103489   unknown    unknown    unk

nown

 

            Bilirubin.total_Presen  unknown    92300003   unknown    unknown    

unknown



           ce_in_Urine_by_Test_str                                             



           ip                                                     

 

            Color_of_Urine  unknown    16696144   unknown    unknown    unknown

 

            Glucose_Mass_volume_in  unknown    51906463   unknown    unknown    

unknown



           _Urine_by_Test_strip                                             

 

            Ketones_Mass_volume_in  unknown    63200436   unknown    unknown    

unknown



           _Urine_by_Test_strip                                             

 

            Leukocyte_esterase_Pre  unknown    42408556   unknown    unknown    

unknown



           sence_in_Urine_by_Test_                                             



           strip                                                  

 

            Nitrite_Presence_in_Ur  unknown    63128153   unknown    unknown    

unknown



           ine_by_Test_strip                                             

 

            pH_of_Urine_by_Test_st  unknown    83401253   unknown    unknown    

unknown



           rip                                                    

 

            Specific_gravity_of_Ur  unknown    96258639   unknown    unknown    

unknown



           ine_by_Test_strip                                             

 

            Urobilinogen_Presence_  unknown    2021   unknown    unknown    

unknown



           in_Urine_by_Test_strip                                             

 

            Streptococcus_pyogenes  unknown    69960221   unknown    unknown    

unknown



           _DNA_Presence_in_Throat                                             



           _by_NAA_with_probe_dete                                             



           ction                                                  









         facility  observation  status  value   reference  units   lab     [facility]

mal  line



                                        range           code            notes

 

         All     Microbial_id  unknown  Neg     unknown          _3554   unknown

  unknown



                entification_                                                 



                kit_rapid_str                                                 



                ep_method                                                 

 

         All     Streptococcu  unknown  Neg     unknown          _6048   unknown

  unknown



                s_pyogenes_DN                                 9-2             



                A_Presence_in                                                 



                _Throat_by_NA                                                 



                A_with_probe_                                                 



                detection                                                 

 

         All     T       unknown  NEGATIVE  unknown          COVID   unknown  un

known



                                                        -19             

 

         All     COVID-19_REF  unknown  NEGATIVE  unknown          COVID   unkno

wn  unknown



                ERENCE_TEST                                 19.REF          

 

         All     Microbial_id  unknown  Neg     unknown          _3554   unknown

  unknown



                entification_                                                 



                kit_rapid_str                                                 



                ep_method                                                 

 

         All     Streptococcu  unknown  Neg     unknown          _6048   unknown

  unknown



                s_pyogenes_DN                                 9-2             



                A_Presence_in                                                 



                _Throat_by_NA                                                 



                A_with_probe_                                                 



                detection                                                 

 

         All     _2019NCoV_CO  unknown  NEGATIVE  unknown          _6659   unkno

wn  unknown



                VID-19_Lab_Te                                 97              



                st_Result_Tex                                                 



                t_                                                      

 

         All     T       unknown  NEGATIVE  unknown          COVID   unknown  un

known



                                                        -19             

 

         All     COVID-19_REF  unknown  NEGATIVE  unknown          COVID   unkno

wn  unknown



                ERENCE_TEST                                 19.REF          

 

         All     _2019NCoV_CO  unknown  NEGATIVE  unknown          _6659   unkno

wn  unknown



                VID-19_Lab_Te                                 97              



                st_Result_Tex                                                 



                t_                                                      

 

         All     Microbial_id  unknown  Neg     unknown          _3554   unknown

  unknown



                entification_                                                 



                kit_rapid_str                                                 



                ep_method                                                 

 

         All     Streptococcu  unknown  Neg     unknown          _6048   unknown

  unknown



                s_pyogenes_DN                                 9-2             



                A_Presence_in                                                 



                _Throat_by_NA                                                 



                A_with_probe_                                                 



                detection                                                 

 

         All     DIPSTICK_URI  unknown  5067    unknown          _1014   unknown

  unknown



                NE_STRIP_LOT_                                 00              



                NUMBER                                                  

 

         All     protein_urin  unknown  negative  unknown          _118    unkno

wn  unknown



                e_semiquantit                                                 



                ative_dipstic                                                 



                k_                                                      

 

         All     glucose_urin  unknown  negative  unknown          _123    unkno

wn  unknown



                e_semiquantit                                                 



                ative                                                   

 

         All     Erythrocytes  unknown  2+      unknown          _1394   unknown

  unknown



                _area_in_Urin                                 5-1             



                e_sediment_by                                                 



                _Microscopy_h                                                 



                igh_power_fie                                                 



                ld                                                      

 

         All     RBC_urine_di  unknown  2+      unknown          _1700   unknown

  unknown



                pstick                                  005             

 

         All     Albumin_Pres  unknown  negative  unknown          _1753   unkno

wn  unknown



                ence_in_Urine                                 -3              

 

         All     urine_color  unknown  dark    unknown          _2751   unknown 

 unknown



                                yellow                                  

 

         All     bilirubin_ur  unknown  negative  unknown          _319    unkno

wn  unknown



                ine                                                     

 

         All     ketones_urin  unknown  large   unknown          _322    unknown

  unknown



                e_by_test_str         (160)                                   



                ip                                                      

 

         All     nitrite_urin  unknown  negative  unknown          _323    unkno

wn  unknown



                e_semiquantit                                                 



                ative                                                   

 

         All     pH_urine_sem  unknown  7.5     unknown          _324    unknown

  unknown



                iquantitative                                                 

 

         All     specific_gra  unknown  1.020   unknown          _325    unknown

  unknown



                vity_urine                                                 

 

         All     urobilinogen  unknown  negative  unknown          _326    unkno

wn  unknown



                _urine_semiqu                                                 



                antitative_di                                                 



                pstick_                                                 

 

         All     leukocyte_es  unknown  negative  unknown          _327    unkno

wn  unknown



                terase_urine_                                                 



                by_dipstick                                                 

 

         All     appearance_u  unknown  clear   unknown          _328    unknown

  unknown



                rine                                                    

 

         All     Microbial_id  unknown  Neg     unknown          _3554   unknown

  unknown



                entification_                                                 



                kit_rapid_str                                                 



                ep_method                                                 

 

         All     urinalysis_r  unknown  Clean   unknown          _47     unknown

  unknown



                outine          Catch                                   

 

         All     culture_stat  unknown  Yes     unknown          _5101   unknown

  unknown



                us                                      5               

 

         All     Appearance_o  unknown  clear   unknown          _5767   unknown

  unknown



                f_Urine                                 -9              

 

         All     Bilirubin.to  unknown  negative  unknown          _5770   unkno

wn  unknown



                tal_Presence_                                 -3              



                in_Urine_by_T                                                 



                est_strip                                                 

 

         All     Color_of_Uri  unknown  dark    unknown          _5778   unknown

  unknown



                ne              yellow                  -6              

 

         All     Glucose_Mass  unknown  negative  unknown          _5792   unkno

wn  unknown



                _volume_in_Ur                                 -7              



                ine_by_Test_s                                                 



                trip                                                    

 

         All     Ketones_Mass  unknown  large   unknown          _5797   unknown

  unknown



                _volume_in_Ur         (160)                   -6              



                ine_by_Test_s                                                 



                trip                                                    

 

         All     Leukocyte_es  unknown  negative  unknown          _5799   unkno

wn  unknown



                terase_Presen                                 -2              



                ce_in_Urine_b                                                 



                y_Test_strip                                                 

 

         All     Nitrite_Pres  unknown  negative  unknown          _5802   unkno

wn  unknown



                ence_in_Urine                                 -4              



                _by_Test_stri                                                 



                p                                                       

 

         All     pH_of_Urine_  unknown  7.5     unknown          _5803   unknown

  unknown



                by_Test_strip                                 -2              

 

         All     Specific_gra  unknown  1.020   unknown          _5811   unknown

  unknown



                vity_of_Urine                                 -5              



                _by_Test_stri                                                 



                p                                                       

 

         All     Urobilinogen  unknown  negative  unknown          _5818   unkno

wn  unknown



                _Presence_in_                                 -0              



                Urine_by_Test                                                 



                _strip                                                  

 

         All     Streptococcu  unknown  Neg     unknown          _6048   unknown

  unknown



                s_pyogenes_DN                                 9-2             



                A_Presence_in                                                 



                _Throat_by_NA                                                 



                A_with_probe_                                                 



                detection                                                 

 

         All     T       unknown  NEGATIVE  unknown          COVID   unknown  un

known



                                                        -19             

 

         All     COVID-19_REF  unknown  NEGATIVE  unknown          COVID   unkno

wn  unknown



                ERENCE_TEST                                 19.REF          

 

         All     DIPSTICK_URI  unknown  5067    unknown          _1014   unknown

  unknown



                NE_STRIP_LOT_                                 00              



                NUMBER                                                  

 

         All     protein_urin  unknown  negative  unknown          _118    unkno

wn  unknown



                e_semiquantit                                                 



                ative_dipstic                                                 



                k_                                                      

 

         All     glucose_urin  unknown  negative  unknown          _123    unkno

wn  unknown



                e_semiquantit                                                 



                ative                                                   

 

         All     Erythrocytes  unknown  2+      unknown          _1394   unknown

  unknown



                _area_in_Urin                                 5-1             



                e_sediment_by                                                 



                _Microscopy_h                                                 



                igh_power_fie                                                 



                ld                                                      

 

         All     RBC_urine_di  unknown  2+      unknown          _1700   unknown

  unknown



                pstick                                  005             

 

         All     Albumin_Pres  unknown  negative  unknown          _1753   unkno

wn  unknown



                ence_in_Urine                                 -3              

 

         All     urine_color  unknown  dark    unknown          _2751   unknown 

 unknown



                                yellow                                  

 

         All     bilirubin_ur  unknown  negative  unknown          _319    unkno

wn  unknown



                ine                                                     

 

         All     ketones_urin  unknown  large   unknown          _322    unknown

  unknown



                e_by_test_str         (160)                                   



                ip                                                      

 

         All     nitrite_urin  unknown  negative  unknown          _323    unkno

wn  unknown



                e_semiquantit                                                 



                ative                                                   

 

         All     pH_urine_sem  unknown  7.5     unknown          _324    unknown

  unknown



                iquantitative                                                 

 

         All     specific_gra  unknown  1.020   unknown          _325    unknown

  unknown



                vity_urine                                                 

 

         All     urobilinogen  unknown  negative  unknown          _326    unkno

wn  unknown



                _urine_semiqu                                                 



                antitative_di                                                 



                pstick_                                                 

 

         All     leukocyte_es  unknown  negative  unknown          _327    unkno

wn  unknown



                terase_urine_                                                 



                by_dipstick                                                 

 

         All     appearance_u  unknown  clear   unknown          _328    unknown

  unknown



                rine                                                    

 

         All     Microbial_id  unknown  Neg     unknown          _3554   unknown

  unknown



                entification_                                                 



                kit_rapid_str                                                 



                ep_method                                                 

 

         All     urinalysis_r  unknown  Clean   unknown          _47     unknown

  unknown



                outine          Catch                                   

 

         All     culture_stat  unknown  Yes     unknown          _5101   unknown

  unknown



                us                                      5               

 

         All     Appearance_o  unknown  clear   unknown          _5767   unknown

  unknown



                f_Urine                                 -9              

 

         All     Bilirubin.to  unknown  negative  unknown          _5770   unkno

wn  unknown



                tal_Presence_                                 -3              



                in_Urine_by_T                                                 



                est_strip                                                 

 

         All     Color_of_Uri  unknown  dark    unknown          _5778   unknown

  unknown



                ne              yellow                  -6              

 

         All     Glucose_Mass  unknown  negative  unknown          _5792   unkno

wn  unknown



                _volume_in_Ur                                 -7              



                ine_by_Test_s                                                 



                trip                                                    

 

         All     Ketones_Mass  unknown  large   unknown          _5797   unknown

  unknown



                _volume_in_Ur         (160)                   -6              



                ine_by_Test_s                                                 



                trip                                                    

 

         All     Leukocyte_es  unknown  negative  unknown          _5799   unkno

wn  unknown



                terase_Presen                                 -2              



                ce_in_Urine_b                                                 



                y_Test_strip                                                 

 

         All     Nitrite_Pres  unknown  negative  unknown          _5802   unkno

wn  unknown



                ence_in_Urine                                 -4              



                _by_Test_stri                                                 



                p                                                       

 

         All     pH_of_Urine_  unknown  7.5     unknown          _5803   unknown

  unknown



                by_Test_strip                                 -2              

 

         All     Specific_gra  unknown  1.020   unknown          _5811   unknown

  unknown



                vity_of_Urine                                 -5              



                _by_Test_stri                                                 



                p                                                       

 

         All     Urobilinogen  unknown  negative  unknown          _5818   unkno

wn  unknown



                _Presence_in_                                 -0              



                Urine_by_Test                                                 



                _strip                                                  

 

         All     Streptococcu  unknown  Neg     unknown          _6048   unknown

  unknown



                s_pyogenes_DN                                 9-2             



                A_Presence_in                                                 



                _Throat_by_NA                                                 



                A_with_probe_                                                 



                detection                                                 

 

         All     _2019NCoV_CO  unknown  NEGATIVE  unknown          _6659   unkno

wn  unknown



                VID-19_Lab_Te                                 97              



                st_Result_Tex                                                 



                t_                                                      

 

         All     DIPSTICK_URI  unknown  5067    unknown          _1014   unknown

  unknown



                NE_STRIP_LOT_                                 00              



                NUMBER                                                  

 

         All     protein_urin  unknown  negative  unknown          _118    unkno

wn  unknown



                e_semiquantit                                                 



                ative_dipstic                                                 



                k_                                                      

 

         All     glucose_urin  unknown  negative  unknown          _123    unkno

wn  unknown



                e_semiquantit                                                 



                ative                                                   

 

         All     Erythrocytes  unknown  2+      unknown          _1394   unknown

  unknown



                _area_in_Urin                                 5-1             



                e_sediment_by                                                 



                _Microscopy_h                                                 



                igh_power_fie                                                 



                ld                                                      

 

         All     RBC_urine_di  unknown  2+      unknown          _1700   unknown

  unknown



                pstick                                  005             

 

         All     Albumin_Pres  unknown  negative  unknown          _1753   unkno

wn  unknown



                ence_in_Urine                                 -3              

 

         All     urine_color  unknown  dark    unknown          _2751   unknown 

 unknown



                                yellow                                  

 

         All     bilirubin_ur  unknown  negative  unknown          _319    unkno

wn  unknown



                ine                                                     

 

         All     ketones_urin  unknown  large   unknown          _322    unknown

  unknown



                e_by_test_str         (160)                                   



                ip                                                      

 

         All     nitrite_urin  unknown  negative  unknown          _323    unkno

wn  unknown



                e_semiquantit                                                 



                ative                                                   

 

         All     pH_urine_sem  unknown  7.5     unknown          _324    unknown

  unknown



                iquantitative                                                 

 

         All     specific_gra  unknown  1.020   unknown          _325    unknown

  unknown



                vity_urine                                                 

 

         All     urobilinogen  unknown  negative  unknown          _326    unkno

wn  unknown



                _urine_semiqu                                                 



                antitative_di                                                 



                pstick_                                                 

 

         All     leukocyte_es  unknown  negative  unknown          _327    unkno

wn  unknown



                terase_urine_                                                 



                by_dipstick                                                 

 

         All     appearance_u  unknown  clear   unknown          _328    unknown

  unknown



                rine                                                    

 

         All     Microbial_id  unknown  Neg     unknown          _3554   unknown

  unknown



                entification_                                                 



                kit_rapid_str                                                 



                ep_method                                                 

 

         All     urinalysis_r  unknown  Clean   unknown          _47     unknown

  unknown



                outine          Catch                                   

 

         All     culture_stat  unknown  Yes     unknown          _[id number]   unknown

  unknown



                us                                      5               

 

         All     Appearance_o  unknown  clear   unknown          _5767   unknown

  unknown



                f_Urine                                 -9              

 

         All     Bilirubin.to  unknown  negative  unknown          _5770   unkno

wn  unknown



                tal_Presence_                                 -3              



                in_Urine_by_T                                                 



                est_strip                                                 

 

         All     Color_of_Uri  unknown  dark    unknown          _5778   unknown

  unknown



                ne              yellow                  -6              

 

         All     Glucose_Mass  unknown  negative  unknown          _5792   unkno

wn  unknown



                _volume_in_Ur                                 -7              



                ine_by_Test_s                                                 



                trip                                                    

 

         All     Ketones_Mass  unknown  large   unknown          _5797   unknown

  unknown



                _volume_in_Ur         (160)                   -6              



                ine_by_Test_s                                                 



                trip                                                    

 

         All     Leukocyte_es  unknown  negative  unknown          _5799   unkno

wn  unknown



                terase_Presen                                 -2              



                ce_in_Urine_b                                                 



                y_Test_strip                                                 

 

         All     Nitrite_Pres  unknown  negative  unknown          _5802   unkno

wn  unknown



                ence_in_Urine                                 -4              



                _by_Test_stri                                                 



                p                                                       

 

         All     pH_of_Urine_  unknown  7.5     unknown          _5803   unknown

  unknown



                by_Test_strip                                 -2              

 

         All     Specific_gra  unknown  1.020   unknown          _5811   unknown

  unknown



                vity_of_Urine                                 -5              



                _by_Test_stri                                                 



                p                                                       

 

         All     Urobilinogen  unknown  negative  unknown          _5818   unkno

wn  unknown



                _Presence_in_                                 -0              



                Urine_by_Test                                                 



                _strip                                                  

 

         All     Streptococcu  unknown  Neg     unknown          _6048   unknown

  unknown



                s_pyogenes_DN                                 9-2             



                A_Presence_in                                                 



                _Throat_by_NA                                                 



                A_with_probe_                                                 



                detection                                                 







Vital Signs







                 date            measurement     value           source

 

                 2021        BP_diastolic    73              mm[Hg]

 

                 2021        BP_systolic     121             mm[Hg]

 

                 2021        heart_rate      71              /min

 

                 2021        respiration_rate  12              /min

 

                 2021        temperature_metric  36.33           C

 

                 2021        temperature_standard  97.4            F

 

                 2021        BP_diastolic    73              mm[Hg]

 

                 2021        BP_systolic     121             mm[Hg]

 

                 2021        heart_rate      71              /min

 

                 2021        respiration_rate  12              /min

 

                 2021        temperature_metric  36.33           C

 

                 2021        temperature_standard  97.4            F

 

                 2021        BP_diastolic    73              mm[Hg]

 

                 2021        BP_systolic     121             mm[Hg]

 

                 2021        heart_rate      71              /min

 

                 2021        respiration_rate  12              /min

 

                 2021        temperature_metric  36.33           C

 

                 2021        temperature_standard  97.4            F









                 date            measurement     value           source

 

                 2021        BMI             27.03           kg/m2

 

                 2021        BP_diastolic    72              mm[Hg]

 

                 2021        BP_systolic     114             mm[Hg]

 

                 2021        heart_rate      95              /min

 

                 2021        height_metric   176.53          cm

 

                 2021        height_standard  69.5            in

 

                 2021        respiration_rate  12              /min

 

                 2021        temperature_metric  37.89           C

 

                 2021        temperature_standard  100.2           F

 

                 2021        weight_metric   83.91           kg

 

                 2021        weight_standard  185             lb

 

                 2021        BMI             27.03           kg/m2

 

                 2021        BP_diastolic    72              mm[Hg]

 

                 2021        BP_systolic     114             mm[Hg]

 

                 2021        heart_rate      95              /min

 

                 2021        height_metric   176.53          cm

 

                 2021        height_standard  69.5            in

 

                 2021        respiration_rate  12              /min

 

                 2021        temperature_metric  37.89           C

 

                 2021        temperature_standard  100.2           F

 

                 2021        weight_metric   83.91           kg

 

                 2021        weight_standard  185             lb

 

                 2021        BMI             27.03           kg/m2

 

                 2021        BP_diastolic    72              mm[Hg]

 

                 2021        BP_systolic     114             mm[Hg]

 

                 2021        heart_rate      95              /min

 

                 2021        height_metric   176.53          cm

 

                 2021        height_standard  69.5            in

 

                 2021        respiration_rate  12              /min

 

                 2021        temperature_metric  37.89           C

 

                 2021        temperature_standard  100.2           F

 

                 2021        weight_metric   83.91           kg

 

                 2021        weight_standard  185             lb







Social History







                     date                description         facility

 

                     [id number]

## 2021-04-07 NOTE — EXTERNAL MEDICAL SUMMARY RPT
Continuity of Care Document

                            Created on:2021



Patient:SUNIL VARGHESE

Sex:Male

:1985

External Reference #:[id number]





Demographics







                          Phone                     Unavailable

 

                          Preferred Language        Unknown

 

                          Marital Status            Unknown

 

                          Alevism Affiliation     Unknown

 

                          Race                      Unknown

 

                          Ethnic Group              Unknown









Author







                          Organization              [facility]

 

                          [place] [address]

 

                          Phone                     [phone]









Care Team Providers







                    Name                Role                Phone

 

                    MD, Oliver Suazo, Unavailable         Unavailable

 

                    RN, Jennifer Goodman,  Unavailable         Unavailable

 

                    Registrar, Rosa Robledo,Patient Unavailable         Rudy lombardo MD, Galo Liang, Unavailable         Unavailable









Problems







                     date                description         facility

 

                     [id number]            Alcohol use         All

 

                     46001705            COVID19 Testing     All

 

                     [id number]            Details of drug misuse behavior  All

 

                     08090618            Fever, unspecified  All

 

                     78060106            Alcohol intake      All

 

                     04708371            Fever               All

 

                     03114829            Acute pharyngitis   All

 

                     11465236            Acute pharyngitis, unspecified  All

 

                     39369713            Alcohol use         All

 

                     55597419            COVID19 Testing     All

 

                     80507544            Throat Culture      All

 

                     70554178            Urine C&S           All

 

                     93181226            Details of drug misuse behavior  All

 

                     46782361            Exudative pharyngitis  All







Medications







                     date                description         facility

 

                     23553355            CYCLOBENZAPRINE HCL  All

 

                     14176831            IBUPROFEN           All

 

                     [id number]            IBUPROFEN           All

 

                     02769620            CYCLOBENZAPRINE HCL  All

 

                     13616927            CYCLOBENZAPRINE HCL  All

 

                     23326914            IBUPROFEN           All

 

                     51077324            IBUPROFEN           All

 

                     92298637            CYCLOBENZAPRINE HCL  All

 

                     45527429            CYCLOBENZAPRINE HCL  All

 

                     10864068            IBUPROFEN           All

 

                     28106369            IBUPROFEN           All

 

                     19771663            CYCLOBENZAPRINE HCL  All

 

                     26857392            CYCLOBENZAPRINE HCL  All

 

                     72907925            IBUPROFEN           All

 

                     58780639            IBUPROFEN           All

 

                     25421672            CYCLOBENZAPRINE HCL  All

 

                     73060132            AMOXICILLIN-POT CLAVULANATE  All

 

                     11075973            AMOXICILLIN-POT CLAVULANATE  All







Procedures







                     date                description         facility

 

                     [id number]            POC STREP ASSAY W/OPTIC  All









                     date                description         facility

 

                     70255613            COVID19 Testing     All









                     date                description         facility

 

                     03396723            POC STREP ASSAY W/OPTIC  All









                     date                description         facility

 

                     29174595            COVID19 Testing     All









                     date                description         facility

 

                     81497991            POC STREP ASSAY W/OPTIC  All









                     date                description         facility

 

                     54480629            POC STREP ASSAY W/OPTIC  All









                     date                description         facility

 

                     30059345            POC URINALYSIS DIP  All









                     date                description         facility

 

                     59069521            POC STREP TEST      All









                     date                description         facility

 

                     88135281            IM or SQ Injection  All









                     date                description         facility

 

                     2021            Dexamethasone Sodium Phosphate Inj 10mg

/1mL  All









                     date                description         facility

 

                     48827152            POC URINALYSIS DIP  All









                     date                description         facility

 

                     71502381            POC STREP TEST      All









                     date                description         facility

 

                     2021            IM or SQ Injection  All









                     date                description         facility

 

                     2021            Dexamethasone Sodium Phosphate Inj 10mg

/1mL  All







Results







            test       status     date       ordered by  attending  specimen jun

e

 

            Microbial_identificati  unknown    56074721   unknown    unknown    

unknown



           on_kit_rapid_strep_meth                                             



           od                                                     

 

            Streptococcus_pyogenes  unknown    42283846   unknown    unknown    

unknown



           _DNA_Presence_in_Throat                                             



           _by_NAA_with_probe_dete                                             



           ction                                                  

 

            T          unknown    61641763   unknown    unknown    unknown

 

            COVID-19_REFERENCE_TES  unknown    12065009   unknown    unknown    

unknown



           T                                                      

 

            Microbial_identificati  unknown    32413936   unknown    unknown    

unknown



           on_kit_rapid_strep_meth                                             



           od                                                     

 

            Streptococcus_pyogenes  unknown    88283134   unknown    unknown    

unknown



           _DNA_Presence_in_Throat                                             



           _by_NAA_with_probe_dete                                             



           ction                                                  

 

            _2019NCoV_COVID-19_Lab  unknown    83656587   unknown    unknown    

unknown



           _Test_Result_Text_                                             

 

            T          unknown    75803643   unknown    unknown    unknown

 

            COVID-19_REFERENCE_TES  unknown    58293462   unknown    unknown    

unknown



           T                                                      

 

            _2019NCoV_COVID-19_Lab  unknown    10240302   unknown    unknown    

unknown



           _Test_Result_Text_                                             

 

            Microbial_identificati  unknown    94247297   unknown    unknown    

unknown



           on_kit_rapid_strep_meth                                             



           od                                                     

 

            Streptococcus_pyogenes  unknown    00504279   unknown    unknown    

unknown



           _DNA_Presence_in_Throat                                             



           _by_NAA_with_probe_dete                                             



           ction                                                  

 

            DIPSTICK_URINE_STRIP_L  unknown    83812586   unknown    unknown    

unknown



           OT_NUMBER                                              

 

            protein_urine_semiquan  unknown    98817846   unknown    unknown    

unknown



           titative_dipstick_                                             

 

            glucose_urine_semiquan  unknown    37117227   unknown    unknown    

unknown



           titative                                               

 

            Erythrocytes_area_in_U  unknown    14773375   unknown    unknown    

unknown



           rine_sediment_by_Micros                                             



           copy_high_power_field                                             

 

            RBC_urine_dipstick  unknown    91902138   unknown    unknown    unkn

own

 

            Albumin_Presence_in_Ur  unknown    89025567   unknown    unknown    

unknown



           ine                                                    

 

            urine_color  unknown    82330208   unknown    unknown    unknown

 

            bilirubin_urine  unknown    75099726   unknown    unknown    unknown

 

            ketones_urine_by_test_  unknown    88383893   unknown    unknown    

unknown



           strip                                                  

 

            nitrite_urine_semiquan  unknown    27832384   unknown    unknown    

unknown



           titative                                               

 

            pH_urine_semiquantitat  unknown    87165923   unknown    unknown    

unknown



           baylee                                                    

 

            specific_gravity_urine  unknown    00890620   unknown    unknown    

unknown

 

            urobilinogen_urine_sem  unknown    98755560   unknown    unknown    

unknown



           iquantitative_dipstick_                                             

 

            leukocyte_esterase_uri  unknown    81804825   unknown    unknown    

unknown



           ne_by_dipstick                                             

 

            appearance_urine  unknown    53911237   unknown    unknown    unknow

n

 

            Microbial_identificati  unknown    07182790   unknown    unknown    

unknown



           on_kit_rapid_strep_meth                                             



           od                                                     

 

            urinalysis_routine  unknown    84802857   unknown    unknown    unkn

own

 

            culture_status  unknown    48149144   unknown    unknown    unknown

 

            Appearance_of_Urine  unknown    90370791   unknown    unknown    unk

nown

 

            Bilirubin.total_Presen  unknown    62331595   unknown    unknown    

unknown



           ce_in_Urine_by_Test_str                                             



           ip                                                     

 

            Color_of_Urine  unknown    24402475   unknown    unknown    unknown

 

            Glucose_Mass_volume_in  unknown    27530944   unknown    unknown    

unknown



           _Urine_by_Test_strip                                             

 

            Ketones_Mass_volume_in  unknown    18948604   unknown    unknown    

unknown



           _Urine_by_Test_strip                                             

 

            Leukocyte_esterase_Pre  unknown    76761046   unknown    unknown    

unknown



           sence_in_Urine_by_Test_                                             



           strip                                                  

 

            Nitrite_Presence_in_Ur  unknown    32254497   unknown    unknown    

unknown



           ine_by_Test_strip                                             

 

            pH_of_Urine_by_Test_st  unknown    26788100   unknown    unknown    

unknown



           rip                                                    

 

            Specific_gravity_of_Ur  unknown    59278198   unknown    unknown    

unknown



           ine_by_Test_strip                                             

 

            Urobilinogen_Presence_  unknown    64796727   unknown    unknown    

unknown



           in_Urine_by_Test_strip                                             

 

            Streptococcus_pyogenes  unknown    81670254   unknown    unknown    

unknown



           _DNA_Presence_in_Throat                                             



           _by_NAA_with_probe_dete                                             



           ction                                                  

 

            DIPSTICK_URINE_STRIP_L  unknown    50756099   unknown    unknown    

unknown



           OT_NUMBER                                              

 

            protein_urine_semiquan  unknown    33679925   unknown    unknown    

unknown



           titative_dipstick_                                             

 

            glucose_urine_semiquan  unknown    63361255   unknown    unknown    

unknown



           titative                                               

 

            Erythrocytes_area_in_U  unknown    62227618   unknown    unknown    

unknown



           rine_sediment_by_Micros                                             



           copy_high_power_field                                             

 

            RBC_urine_dipstick  unknown    06105236   unknown    unknown    unkn

own

 

            Albumin_Presence_in_Ur  unknown    99880118   unknown    unknown    

unknown



           ine                                                    

 

            urine_color  unknown    40076731   unknown    unknown    unknown

 

            bilirubin_urine  unknown    50719253   unknown    unknown    unknown

 

            ketones_urine_by_test_  unknown    70829855   unknown    unknown    

unknown



           strip                                                  

 

            nitrite_urine_semiquan  unknown    30035297   unknown    unknown    

unknown



           titative                                               

 

            pH_urine_semiquantitat  unknown    72298951   unknown    unknown    

unknown



           baylee                                                    

 

            specific_gravity_urine  unknown    49355615   unknown    unknown    

unknown

 

            urobilinogen_urine_sem  unknown    83886227   unknown    unknown    

unknown



           iquantitative_dipstick_                                             

 

            leukocyte_esterase_uri  unknown    21525037   unknown    unknown    

unknown



           ne_by_dipstick                                             

 

            appearance_urine  unknown    73028257   unknown    unknown    unknow

n

 

            Microbial_identificati  unknown    99837160   unknown    unknown    

unknown



           on_kit_rapid_strep_meth                                             



           od                                                     

 

            urinalysis_routine  unknown    56994534   unknown    unknown    unkn

own

 

            culture_status  unknown    30467512   unknown    unknown    unknown

 

            Appearance_of_Urine  unknown    38749816   unknown    unknown    unk

nown

 

            Bilirubin.total_Presen  unknown    91269440   unknown    unknown    

unknown



           ce_in_Urine_by_Test_str                                             



           ip                                                     

 

            Color_of_Urine  unknown    60106157   unknown    unknown    unknown

 

            Glucose_Mass_volume_in  unknown    36695639   unknown    unknown    

unknown



           _Urine_by_Test_strip                                             

 

            Ketones_Mass_volume_in  unknown    27448921   unknown    unknown    

unknown



           _Urine_by_Test_strip                                             

 

            Leukocyte_esterase_Pre  unknown    11122622   unknown    unknown    

unknown



           sence_in_Urine_by_Test_                                             



           strip                                                  

 

            Nitrite_Presence_in_Ur  unknown    30190292   unknown    unknown    

unknown



           ine_by_Test_strip                                             

 

            pH_of_Urine_by_Test_st  unknown    24542672   unknown    unknown    

unknown



           rip                                                    

 

            Specific_gravity_of_Ur  unknown    52402804   unknown    unknown    

unknown



           ine_by_Test_strip                                             

 

            Urobilinogen_Presence_  unknown    39389591   unknown    unknown    

unknown



           in_Urine_by_Test_strip                                             

 

            Streptococcus_pyogenes  unknown    21605539   unknown    unknown    

unknown



           _DNA_Presence_in_Throat                                             



           _by_NAA_with_probe_dete                                             



           ction                                                  









         facility  observation  status  value   reference  units   lab     [facility]nor

mal  line



                                        range           code            notes

 

         All     Microbial_id  unknown  Neg     unknown          _3554   unknown

  unknown



                entification_                                                 



                kit_rapid_str                                                 



                ep_method                                                 

 

         All     Streptococcu  unknown  Neg     unknown          _6048   unknown

  unknown



                s_pyogenes_DN                                 9-2             



                A_Presence_in                                                 



                _Throat_by_NA                                                 



                A_with_probe_                                                 



                detection                                                 

 

         All     T       unknown  NEGATIVE  unknown          COVID   unknown  un

known



                                                        -19             

 

         All     COVID-19_REF  unknown  NEGATIVE  unknown          COVID   unkno

wn  unknown



                ERENCE_TEST                                 19.REF          

 

         All     Microbial_id  unknown  Neg     unknown          _3554   unknown

  unknown



                entification_                                                 



                kit_rapid_str                                                 



                ep_method                                                 

 

         All     Streptococcu  unknown  Neg     unknown          _6048   unknown

  unknown



                s_pyogenes_DN                                 9-2             



                A_Presence_in                                                 



                _Throat_by_NA                                                 



                A_with_probe_                                                 



                detection                                                 

 

         All     _2019NCoV_CO  unknown  NEGATIVE  unknown          _6659   unkno

wn  unknown



                VID-19_Lab_Te                                 97              



                st_Result_Tex                                                 



                t_                                                      

 

         All     T       unknown  NEGATIVE  unknown          COVID   unknown  un

known



                                                        -19             

 

         All     COVID-19_REF  unknown  NEGATIVE  unknown          COVID   unkno

wn  unknown



                ERENCE_TEST                                 19.REF          

 

         All     _2019NCoV_CO  unknown  NEGATIVE  unknown          _6659   unkno

wn  unknown



                VID-19_Lab_Te                                 97              



                st_Result_Tex                                                 



                t_                                                      

 

         All     Microbial_id  unknown  Neg     unknown          _3554   unknown

  unknown



                entification_                                                 



                kit_rapid_str                                                 



                ep_method                                                 

 

         All     Streptococcu  unknown  Neg     unknown          _6048   unknown

  unknown



                s_pyogenes_DN                                 9-2             



                A_Presence_in                                                 



                _Throat_by_NA                                                 



                A_with_probe_                                                 



                detection                                                 

 

         All     DIPSTICK_URI  unknown  5067    unknown          _1014   unknown

  unknown



                NE_STRIP_LOT_                                 00              



                NUMBER                                                  

 

         All     protein_urin  unknown  negative  unknown          _118    unkno

wn  unknown



                e_semiquantit                                                 



                ative_dipstic                                                 



                k_                                                      

 

         All     glucose_urin  unknown  negative  unknown          _123    unkno

wn  unknown



                e_semiquantit                                                 



                ative                                                   

 

         All     Erythrocytes  unknown  2+      unknown          _1394   unknown

  unknown



                _area_in_Urin                                 5-1             



                e_sediment_by                                                 



                _Microscopy_h                                                 



                igh_power_fie                                                 



                ld                                                      

 

         All     RBC_urine_di  unknown  2+      unknown          _1700   unknown

  unknown



                pstick                                  005             

 

         All     Albumin_Pres  unknown  negative  unknown          _1753   unkno

wn  unknown



                ence_in_Urine                                 -3              

 

         All     urine_color  unknown  dark    unknown          _2751   unknown 

 unknown



                                yellow                                  

 

         All     bilirubin_ur  unknown  negative  unknown          _319    unkno

wn  unknown



                ine                                                     

 

         All     ketones_urin  unknown  large   unknown          _322    unknown

  unknown



                e_by_test_str         (160)                                   



                ip                                                      

 

         All     nitrite_urin  unknown  negative  unknown          _323    unkno

wn  unknown



                e_semiquantit                                                 



                ative                                                   

 

         All     pH_urine_sem  unknown  7.5     unknown          _324    unknown

  unknown



                iquantitative                                                 

 

         All     specific_gra  unknown  1.020   unknown          _325    unknown

  unknown



                vity_urine                                                 

 

         All     urobilinogen  unknown  negative  unknown          _326    unkno

wn  unknown



                _urine_semiqu                                                 



                antitative_di                                                 



                pstick_                                                 

 

         All     leukocyte_es  unknown  negative  unknown          _327    unkno

wn  unknown



                terase_urine_                                                 



                by_dipstick                                                 

 

         All     appearance_u  unknown  clear   unknown          _328    unknown

  unknown



                rine                                                    

 

         All     Microbial_id  unknown  Neg     unknown          _3554   unknown

  unknown



                entification_                                                 



                kit_rapid_str                                                 



                ep_method                                                 

 

         All     urinalysis_r  unknown  Clean   unknown          _47     unknown

  unknown



                outine          Catch                                   

 

         All     culture_stat  unknown  Yes     unknown          _5101   unknown

  unknown



                us                                      5               

 

         All     Appearance_o  unknown  clear   unknown          _5767   unknown

  unknown



                f_Urine                                 -9              

 

         All     Bilirubin.to  unknown  negative  unknown          _5770   unkno

wn  unknown



                tal_Presence_                                 -3              



                in_Urine_by_T                                                 



                est_strip                                                 

 

         All     Color_of_Uri  unknown  dark    unknown          _5778   unknown

  unknown



                ne              yellow                  -6              

 

         All     Glucose_Mass  unknown  negative  unknown          _5792   unkno

wn  unknown



                _volume_in_Ur                                 -7              



                ine_by_Test_s                                                 



                trip                                                    

 

         All     Ketones_Mass  unknown  large   unknown          _5797   unknown

  unknown



                _volume_in_Ur         (160)                   -6              



                ine_by_Test_s                                                 



                trip                                                    

 

         All     Leukocyte_es  unknown  negative  unknown          _5799   unkno

wn  unknown



                terase_Presen                                 -2              



                ce_in_Urine_b                                                 



                y_Test_strip                                                 

 

         All     Nitrite_Pres  unknown  negative  unknown          _5802   unkno

wn  unknown



                ence_in_Urine                                 -4              



                _by_Test_stri                                                 



                p                                                       

 

         All     pH_of_Urine_  unknown  7.5     unknown          _5803   unknown

  unknown



                by_Test_strip                                 -2              

 

         All     Specific_gra  unknown  1.020   unknown          _5811   unknown

  unknown



                vity_of_Urine                                 -5              



                _by_Test_stri                                                 



                p                                                       

 

         All     Urobilinogen  unknown  negative  unknown          _5818   unkno

wn  unknown



                _Presence_in_                                 -0              



                Urine_by_Test                                                 



                _strip                                                  

 

         All     Streptococcu  unknown  Neg     unknown          _6048   unknown

  unknown



                s_pyogenes_DN                                 9-2             



                A_Presence_in                                                 



                _Throat_by_NA                                                 



                A_with_probe_                                                 



                detection                                                 

 

         All     DIPSTICK_URI  unknown  5067    unknown          _1014   unknown

  unknown



                NE_STRIP_LOT_                                 00              



                NUMBER                                                  

 

         All     protein_urin  unknown  negative  unknown          _118    unkno

wn  unknown



                e_semiquantit                                                 



                ative_dipstic                                                 



                k_                                                      

 

         All     glucose_urin  unknown  negative  unknown          _123    unkno

wn  unknown



                e_semiquantit                                                 



                ative                                                   

 

         All     Erythrocytes  unknown  2+      unknown          _1394   unknown

  unknown



                _area_in_Urin                                 5-1             



                e_sediment_by                                                 



                _Microscopy_h                                                 



                igh_power_fie                                                 



                ld                                                      

 

         All     RBC_urine_di  unknown  2+      unknown          _1700   unknown

  unknown



                pstick                                  005             

 

         All     Albumin_Pres  unknown  negative  unknown          _1753   unkno

wn  unknown



                ence_in_Urine                                 -3              

 

         All     urine_color  unknown  dark    unknown          _2751   unknown 

 unknown



                                yellow                                  

 

         All     bilirubin_ur  unknown  negative  unknown          _319    unkno

wn  unknown



                ine                                                     

 

         All     ketones_urin  unknown  large   unknown          _322    unknown

  unknown



                e_by_test_str         (160)                                   



                ip                                                      

 

         All     nitrite_urin  unknown  negative  unknown          _323    unkno

wn  unknown



                e_semiquantit                                                 



                ative                                                   

 

         All     pH_urine_sem  unknown  7.5     unknown          _324    unknown

  unknown



                iquantitative                                                 

 

         All     specific_gra  unknown  1.020   unknown          _325    unknown

  unknown



                vity_urine                                                 

 

         All     urobilinogen  unknown  negative  unknown          _326    unkno

wn  unknown



                _urine_semiqu                                                 



                antitative_di                                                 



                pstick_                                                 

 

         All     leukocyte_es  unknown  negative  unknown          _327    unkno

wn  unknown



                terase_urine_                                                 



                by_dipstick                                                 

 

         All     appearance_u  unknown  clear   unknown          _328    unknown

  unknown



                rine                                                    

 

         All     Microbial_id  unknown  Neg     unknown          _3554   unknown

  unknown



                entification_                                                 



                kit_rapid_str                                                 



                ep_method                                                 

 

         All     urinalysis_r  unknown  Clean   unknown          _47     unknown

  unknown



                outine          Catch                                   

 

         All     culture_stat  unknown  Yes     unknown          _5101   unknown

  unknown



                us                                      5               

 

         All     Appearance_o  unknown  clear   unknown          _5767   unknown

  unknown



                f_Urine                                 -9              

 

         All     Bilirubin.to  unknown  negative  unknown          _5770   unkno

wn  unknown



                tal_Presence_                                 -3              



                in_Urine_by_T                                                 



                est_strip                                                 

 

         All     Color_of_Uri  unknown  dark    unknown          _5778   unknown

  unknown



                ne              yellow                  -6              

 

         All     Glucose_Mass  unknown  negative  unknown          _5792   unkno

wn  unknown



                _volume_in_Ur                                 -7              



                ine_by_Test_s                                                 



                trip                                                    

 

         All     Ketones_Mass  unknown  large   unknown          _5797   unknown

  unknown



                _volume_in_Ur         (160)                   -6              



                ine_by_Test_s                                                 



                trip                                                    

 

         All     Leukocyte_es  unknown  negative  unknown          _5799   unkno

wn  unknown



                terase_Presen                                 -2              



                ce_in_Urine_b                                                 



                y_Test_strip                                                 

 

         All     Nitrite_Pres  unknown  negative  unknown          _5802   unkno

wn  unknown



                ence_in_Urine                                 -4              



                _by_Test_stri                                                 



                p                                                       

 

         All     pH_of_Urine_  unknown  7.5     unknown          _5803   unknown

  unknown



                by_Test_strip                                 -2              

 

         All     Specific_gra  unknown  1.020   unknown          _5811   unknown

  unknown



                vity_of_Urine                                 -5              



                _by_Test_stri                                                 



                p                                                       

 

         All     Urobilinogen  unknown  negative  unknown          _5818   unkno

wn  unknown



                _Presence_in_                                 -0              



                Urine_by_Test                                                 



                _strip                                                  

 

         All     Streptococcu  unknown  Neg     unknown          _6048   unknown

  unknown



                s_pyogenes_DN                                 9-2             



                A_Presence_in                                                 



                _Throat_by_NA                                                 



                A_with_probe_                                                 



                detection                                                 







Vital Signs







                 date            measurement     value           source

 

                 2021        BP_diastolic    73              mm[Hg]

 

                 2021        BP_systolic     121             mm[Hg]

 

                 2021        heart_rate      71              /min

 

                 [id number]        respiration_rate  12              /min

 

                 2021        temperature_metric  36.33           C

 

                 2021        temperature_standard  97.4            F

 

                 2021        BP_diastolic    73              mm[Hg]

 

                 2021        BP_systolic     121             mm[Hg]

 

                 2021        heart_rate      71              /min

 

                 2021        respiration_rate  12              /min

 

                 2021        temperature_metric  36.33           C

 

                 2021        temperature_standard  97.4            F

 

                 2021        BP_diastolic    73              mm[Hg]

 

                 2021        BP_systolic     121             mm[Hg]

 

                 2021        heart_rate      71              /min

 

                 2021        respiration_rate  12              /min

 

                 2021        temperature_metric  36.33           C

 

                 2021        temperature_standard  97.4            F









                 date            measurement     value           source

 

                 2021        BMI             27.03           kg/m2

 

                 2021        BP_diastolic    72              mm[Hg]

 

                 2021        BP_systolic     114             mm[Hg]

 

                 2021        heart_rate      95              /min

 

                 2021        height_metric   176.53          cm

 

                 2021        height_standard  69.5            in

 

                 2021        respiration_rate  12              /min

 

                 2021        temperature_metric  37.89           C

 

                 2021        temperature_standard  100.2           F

 

                 2021        weight_metric   83.91           kg

 

                 2021        weight_standard  185             lb

 

                 2021        BMI             27.03           kg/m2

 

                 2021        BP_diastolic    72              mm[Hg]

 

                 2021        BP_systolic     114             mm[Hg]

 

                 2021        heart_rate      95              /min

 

                 2021        height_metric   176.53          cm

 

                 2021        height_standard  69.5            in

 

                 2021        respiration_rate  12              /min

 

                 2021        temperature_metric  37.89           C

 

                 2021        temperature_standard  100.2           F

 

                 2021        weight_metric   83.91           kg

 

                 2021        weight_standard  185             lb







Social History







                     date                description         facility

 

                     [id number]

## 2021-10-11 ENCOUNTER — HOSPITAL ENCOUNTER (OUTPATIENT)
Dept: HOSPITAL 76 - DI.N | Age: 36
End: 2021-10-11
Attending: PHYSICIAN ASSISTANT
Payer: COMMERCIAL

## 2021-10-11 DIAGNOSIS — M19.012: Primary | ICD-10-CM

## 2021-10-11 NOTE — XRAY REPORT
PROCEDURE:  Shoulder 3 View LT

 

INDICATIONS:  LEFT SHOULDER PAIN

 

TECHNIQUE:  4 views of the shoulder were acquired.  

 

COMPARISON:  None.

 

FINDINGS:  

 

Bones:  No fractures or dislocations.  No suspicious bony lesions.  Visualized ribs appear intact.  M
ild acromioclavicular joint osteophytosis.

 

Soft tissues:  No suspicious soft tissue calcifications.  

 

IMPRESSION:  Mild left acromioclavicular joint osteoarthritis.

 

Reviewed by: Ankita Anaya MD, PhD on 10/11/2021 5:22 PM PDT

Approved by: Ankita Anaya MD, PhD on 10/11/2021 5:22 PM PDT

 

 

Station ID:  [id number]

## 2024-07-06 ENCOUNTER — HOSPITAL ENCOUNTER (EMERGENCY)
Dept: HOSPITAL 76 - ED | Age: 39
Discharge: HOME | End: 2024-07-06
Payer: COMMERCIAL

## 2024-07-06 VITALS — DIASTOLIC BLOOD PRESSURE: 66 MMHG | SYSTOLIC BLOOD PRESSURE: 117 MMHG

## 2024-07-06 VITALS — OXYGEN SATURATION: 99 %

## 2024-07-06 DIAGNOSIS — S61.432A: Primary | ICD-10-CM
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
## 2024-07-06 PROCEDURE — 99283 EMERGENCY DEPT VISIT LOW MDM: CPT

## 2024-07-06 PROCEDURE — 64450 NJX AA&/STRD OTHER PN/BRANCH: CPT

## 2024-07-06 RX ADMIN — LIDOCAINE HYDROCHLORIDE STA ML: 10 INJECTION, SOLUTION INFILTRATION; PERINEURAL at 08:06

## 2024-07-06 NOTE — ED PHYSICIAN DOCUMENTATION
PD HPI UPPER EXT INJURY





- Stated complaint


Stated Complaint: LT HAND DOG BITE





- Chief complaint


Chief Complaint: Wound





- History obtained from


History obtained from: Patient





- History of Present Illness


Location: Left, Hand


Type of injury: Puncture wound (dog bite)


Where injury occurred: Home


Timing - onset: Today


Timing - duration: Minutes


Timing - details: Abrupt onset, Still present


Improved by: Rest


Worsened by: Moving, Palpating


Associated symptoms: Numbness (surrounding the area of the bite not the 

fingers).  No: Weakness


Contributing factors: No: Prior ortho surgery


Similar symptoms before: Has not had sx before


Recently seen: Not recently seen





- Additonal information


Additional information: 





Previously well 39-year-old Clau Chu presents to the emergency department with

a dog bite to the left hand.  He has some numbness surrounding the area of the 

bite on the palmar surface of the hand.  The bite is between the second and 

third digits and the distal metacarpal area.  There is no numbness to the 

fingers.  He is able to move his hand without difficulty.  He has some 

superficial abrasion to the dorsum of the index finger.





Review of Systems


Constitutional: denies: Fever


Respiratory: denies: Cough


GI: denies: Vomiting





PD PAST MEDICAL HISTORY





- Past Medical History


Cardiovascular: None


Respiratory: None


Endocrine/Autoimmune: None


GI: None


: None


HEENT: None


Psych: None


Musculoskeletal: None


Derm: None





- Past Surgical History


Past Surgical History: No


Ortho: Other





- Present Medications


Home Medications: 


                                Ambulatory Orders











 Medication  Instructions  Recorded  Confirmed


 


Amox/Clav 875/125 [Augmentin] 1 each PO Q12H #10 tablet 07/06/24 


 


Shilajit/Eurycoma Longifol Xtr 1 each PO 07/06/24 





[Testoplex Plus 250-100 mg Cap]   














- Allergies


Allergies/Adverse Reactions: 


                                    Allergies











Allergy/AdvReac Type Severity Reaction Status Date / Time


 


No Known Drug Allergies Allergy   Verified 07/06/24 07:45














- Social History


Does the pt smoke?: No


Smoking Status: Never smoker


Does the pt drink ETOH?: Yes


Does the pt have substance abuse?: No





- Immunizations


Immunizations are current?: Yes





- POLST


Patient has POLST: No





PD ED PE NORMAL





- Vitals


Vital signs reviewed: Yes (normal )





- General


General: Alert and oriented X 3, No acute distress, Well developed/nourished





- HEENT


HEENT: Atraumatic, PERRL, EOMI





- Respiratory


Respiratory: No respiratory distress





- Derm


Derm: Normal color, Warm and dry, No rash





- Extremities


Extremities: No deformity, No edema, Other (There is a 2 cm laceration to the 

palmar surface of the left hand between the distal second and third metacarpals.

 There is subjective numbness surrounding the laceration.  There is no numbness 

to the fingers distal to this.  Distal neurovascular is intact.  Normal range of

motion.)





- Neuro


Neuro: Alert and oriented X 3, CNs 2-12 intact, No motor deficit, Normal speech


Eye Opening: Spontaneous


Motor: Obeys Commands


Verbal: Oriented


GCS Score: 15





- Psych


Psych: Normal mood, Normal affect





Results





- Vitals


Vitals: 


                               Vital Signs - 24 hr











  07/06/24 07/06/24





  07:44 08:41


 


Temperature 36.5 C 36.5 C


 


Heart Rate 89 90


 


Respiratory 16 18





Rate  


 


Blood Pressure 118/62 117/66


 


O2 Saturation 99 99








                                     Oxygen











O2 Source                      Room air

















Procedures





- General procedure


General procedure: 





2 ml of 1% lidocaine were injected into the surrounding tissue of the wound for 

irrigation. Irrigation performed by RN. 





[facility] Medical Decision Making





- ED course


Complexity details: re-evaluated patient, considered differential, d/w patient


ED course: 





39-year-old male presents to the emergency department with a dog bite to the 

left hand this is treated conservatively after anesthetizing the tissue 

surrounding the laceration it is irrigated copiously and left open.  Will place 

patient on a short course of Augmentin. The patient assures me that his work 

will not be affected by his injury.





Departure





- Departure


Disposition: 01 Home, Self Care


Clinical Impression: 


 Animal bite with open wound





Condition: Stable


Instructions:  ED Bite Animal General


Follow-Up: 


ANCELMO MOSLEY DO [Primary Care Provider] - 


Prescriptions: 


Amox/Clav 875/125 [Augmentin] 1 each PO Q12H #10 tablet


Comments: 


Elmer today it looks like you were bit by A [place] bulldog while breaking up a 

fight between the [place] bulldog and the old English bulldog. Dog bites 

especially to the hand have a tendency to get infected and we do not close them 

primarily.  The wound on your hand is being left open and we will place you on a

 prophylactic antibiotic after irrigating the wound copiously.  I have E scribed

 some Augmentin to the [facility] in [place].  This is a prophylactic course 

of antibiotic.  You may notice some redness and drainage from the wound at about

 day #3 from the incident. The redness should not extend more than a centimeter 

from the edge of the wound.  This would be a reason to return to the emergency 

department. Otherwise it will take about 3 weeks for this to heal well enough to

 not notice it. 


Forms:  PCP List


Discharge Date/Time: 07/06/24 08:42